# Patient Record
Sex: FEMALE | Race: WHITE | NOT HISPANIC OR LATINO | Employment: FULL TIME | URBAN - METROPOLITAN AREA
[De-identification: names, ages, dates, MRNs, and addresses within clinical notes are randomized per-mention and may not be internally consistent; named-entity substitution may affect disease eponyms.]

---

## 2018-04-30 ENCOUNTER — GYNECOLOGY VISIT (OUTPATIENT)
Dept: OBGYN | Facility: CLINIC | Age: 33
End: 2018-04-30
Payer: COMMERCIAL

## 2018-04-30 ENCOUNTER — HOSPITAL ENCOUNTER (OUTPATIENT)
Facility: MEDICAL CENTER | Age: 33
End: 2018-04-30
Attending: OBSTETRICS & GYNECOLOGY
Payer: COMMERCIAL

## 2018-04-30 VITALS
WEIGHT: 192 LBS | HEIGHT: 69 IN | BODY MASS INDEX: 28.44 KG/M2 | DIASTOLIC BLOOD PRESSURE: 70 MMHG | SYSTOLIC BLOOD PRESSURE: 115 MMHG

## 2018-04-30 DIAGNOSIS — Z11.51 SCREENING FOR HUMAN PAPILLOMAVIRUS (HPV): ICD-10-CM

## 2018-04-30 DIAGNOSIS — Z12.4 PAP SMEAR FOR CERVICAL CANCER SCREENING: ICD-10-CM

## 2018-04-30 DIAGNOSIS — Z01.419 WELL WOMAN EXAM WITH ROUTINE GYNECOLOGICAL EXAM: ICD-10-CM

## 2018-04-30 PROCEDURE — 87624 HPV HI-RISK TYP POOLED RSLT: CPT

## 2018-04-30 PROCEDURE — 99385 PREV VISIT NEW AGE 18-39: CPT | Performed by: OBSTETRICS & GYNECOLOGY

## 2018-04-30 PROCEDURE — 88175 CYTOPATH C/V AUTO FLUID REDO: CPT

## 2018-04-30 RX ORDER — NORGESTIMATE AND ETHINYL ESTRADIOL 0.25-0.035
1 KIT ORAL DAILY
Qty: 28 TAB | Refills: 12 | Status: SHIPPED | OUTPATIENT
Start: 2018-04-30 | End: 2019-03-19

## 2018-04-30 RX ORDER — METHYLPHENIDATE HYDROCHLORIDE 18 MG/1
18 TABLET ORAL EVERY MORNING
COMMUNITY
End: 2019-03-19

## 2018-04-30 RX ORDER — NORGESTIMATE AND ETHINYL ESTRADIOL 0.25-0.035
1 KIT ORAL DAILY
COMMUNITY
End: 2018-04-30 | Stop reason: SDUPTHER

## 2018-04-30 NOTE — PROGRESS NOTES
"Nhi Napier is a 32 y.o.  female who presents for her Annual Gynecologic Exam      HPI Comments: Pt presents for her annual well woman exam.   Pt has no complaints. Moved to [place] from [place] 1 year ago.  Patient's last menstrual period was 2018.    Review of Systems:   Pertinent positives documented in HPI and all other systems reviewed & are negative    PGYN Hx: OCPs for contraception, no abnl pap, no STDs    All PMH, PSH, allergies, social history and FH reviewed and updated today:  Past Medical History:   Diagnosis Date   • Hypertension    • Migraine        History reviewed. No pertinent surgical history.    Medications:   Current Outpatient Prescriptions Ordered in [facility]   Medication Sig Dispense Refill   • norgestimate-ethinyl estradiol (SPRINTEC 28) 0.25-35 MG-MCG per tablet Take 1 Tab by mouth every day.     • methylphenidate (CONCERTA) 18 MG CR tablet Take 18 mg by mouth every morning.       No current [facility]-ordered facility-administered medications on file.        Patient has no known allergies.    Social History     Social History   • Marital status:      Spouse name: N/A   • Number of children: N/A   • Years of education: N/A     Social History Main Topics   • Smoking status: Never Smoker   • Smokeless tobacco: Never Used   • Alcohol use Yes   • Drug use: No   • Sexual activity: Yes     Partners: Male     Birth control/ protection: Pill     Other Topics Concern   • Not on file     Social History Narrative   • No narrative on file       Family History   Problem Relation Age of Onset   • No Known Problems Mother    • Stroke Father           Objective:   Vital measurements:  Blood pressure 115/70, height 1.753 m (5' 9\"), weight 87.1 kg (192 lb), last menstrual period 2018, not currently breastfeeding.  Body mass index is 28.35 kg/m². (Goal BM I>18 <25)    Physical Exam   Nursing note and vitals reviewed.  Constitutional: She is oriented to person, place, and time. She " appears well-developed and well-nourished. No distress.     HEENT:   Head: Normocephalic and atraumatic.   Right Ear: External ear normal.   Left Ear: External ear normal.   Nose: Nose normal.   Eyes: Conjunctivae and EOM are normal. Pupils are equal, round, and reactive to light. No scleral icterus.     Neck: Normal range of motion. Neck supple. No tracheal deviation present. No thyromegaly present. No cervical or supraclavicular lymphadenopathy.    Pulmonary/Chest: Effort normal and breath sounds normal. No respiratory distress. She has no wheezes. She has no rales. She exhibits no tenderness.     Cardiovascular: Regular, rate and rhythm. No edema.    Breast: Symmetrical, normal consistency without masses., No dimpling or skin changes, Normal nipples without discharge, no axillary lymphadenopathy    Abdominal: Soft. Bowel sounds are normal. She exhibits no distension and no mass. No tenderness. She has no rebound and no guarding.     Genitourinary:  Pelvic exam was performed with patient supine.  External genitalia with no abnormal pigmentation, labial fusion, rashes, tenderness, lesions or injury to the labia bilaterally.  BUS normal  Vagina is pink and moist with no lesions, foul discharge, erythema, tenderness or bleeding. No foreign body around the vagina or signs of injury.   Cervix exhibits no motion tenderness, no discharge and no friability, no lesions.   Uterus is not deviated, not enlarged, not fixed and not tender.  Right adnexa displays no mass, no tenderness and no fullness.  Left adnexa displays no mass, no tenderness and no fullness.     Musculoskeletal: Normal range of motion. Non tender. She exhibits no edema and no tenderness.     Lymphadenopathy: She has no cervical or supraclavicular adenopathy.     Neurological: She is alert and oriented to person, place, and time. She exhibits normal muscle tone.     Skin: Skin is warm and dry. No rash noted. She is not diaphoretic. No erythema. No pallor.      Psychiatric: She has a normal mood and affect. Her behavior is normal. Judgment and thought content normal.        Assessment:     1. Well woman exam with routine gynecological exam  ThinPrep Pap, w/HPV rflx to genotype   2. Pap smear for cervical cancer screening  ThinPrep Pap, w/HPV rflx to genotype   3. Screening for human papillomavirus (HPV)  ThinPrep Pap, w/HPV rflx to genotype         Plan:   Pap and physical exam performed  Self breast awareness discussed.  Encouraged exercise and proper diet.  Mammograms starting @ age 40 annually.  See medications and orders placed in encounter report. OCPs sent to pharmacy  Follow up in 1 year for annual exam or sooner as needed

## 2018-05-02 LAB
CYTOLOGY REG CYTOL: NORMAL
HPV HR 12 DNA CVX QL NAA+PROBE: NEGATIVE
HPV16 DNA SPEC QL NAA+PROBE: NEGATIVE
HPV18 DNA SPEC QL NAA+PROBE: NEGATIVE
SPECIMEN SOURCE: NORMAL

## 2019-03-12 ENCOUNTER — GYNECOLOGY VISIT (OUTPATIENT)
Dept: OBGYN | Facility: CLINIC | Age: 34
End: 2019-03-12
Payer: COMMERCIAL

## 2019-03-12 VITALS
HEIGHT: 69 IN | DIASTOLIC BLOOD PRESSURE: 68 MMHG | SYSTOLIC BLOOD PRESSURE: 108 MMHG | BODY MASS INDEX: 27.7 KG/M2 | WEIGHT: 187 LBS

## 2019-03-12 DIAGNOSIS — O02.1 MISSED ABORTION: ICD-10-CM

## 2019-03-12 PROCEDURE — 99215 OFFICE O/P EST HI 40 MIN: CPT | Performed by: OBSTETRICS & GYNECOLOGY

## 2019-03-12 PROCEDURE — 76817 TRANSVAGINAL US OBSTETRIC: CPT | Performed by: OBSTETRICS & GYNECOLOGY

## 2019-03-12 RX ORDER — MISOPROSTOL 200 UG/1
800 TABLET ORAL EVERY 12 HOURS
Qty: 8 TAB | Refills: 0 | Status: SHIPPED | OUTPATIENT
Start: 2019-03-12 | End: 2019-03-19

## 2019-03-12 NOTE — PROGRESS NOTES
CC: Missed menses    Nhi Napier is a 33 y.o.  who presents presents due to missed menses. LMP 19. Reports she first had a positive pregnancy test on 19 and is thus is 9w0d based off LMP. She was not using anything for birthcontrol.  This is a planned and desired pregnancy.   Had bhcg drawn on 19 which was 3739.  Reports she has been feeling fine thus far in pregnancy. She denies nausea/vomiting, denies headache, denies dysuria, denies  vaginal bleeding/spotting, and denies contractions/cramping.    Partner: [facility]    Review of systems:  Pertinent positives documented in HPI and all other systems reviewed & are negative    GYN History:  Patient's last menstrual period was 2019 (exact date).. Menarche @ 17.  Menses regular, lasting 5-7days, not particularly heavy or painful.  Last pap last April,  no h/o abnormal pap.  No history of cone biopsy, LEEP or any other cervical, uterine or gynecologic surgery. No history of sexually transmitted diseases.  Currently sexually active with .  Utilizing nothing for contraception and has used OCPs, condoms in the past.     OB History:    OB History    Para Term  AB Living   1 0 0 0 0 0   SAB TAB Ectopic Molar Multiple Live Births   0 0 0 0 0 0      # Outcome Date GA Lbr Julien/2nd Weight Sex Delivery Anes PTL Lv   1 Current                   All PMH, PSH, allergies, social history and FH reviewed and updated today:  Past Medical History:  Past Medical History:   Diagnosis Date   • Hypertension    • Migraine    ADD    Past Surgical History:  History reviewed. No pertinent surgical history.    Medications:   Current Outpatient Prescriptions Ordered in [facility]   Medication Sig Dispense Refill   • Prenatal MV-Min-Fe Fum-FA-DHA (PRENATAL 1 PO) Take  by mouth.     • miSOPROStol (CYTOTEC) 200 MCG Tab Insert 4 Tabs in vagina every 12 hours. 8 Tab 0   • methylphenidate (CONCERTA) 18 MG CR tablet Take 18 mg by mouth every morning.     •  "norgestimate-ethinyl estradiol (SPRINTEC 28) 0.25-35 MG-MCG per tablet Take 1 Tab by mouth every day. 28 Tab 12     No current Epic-ordered facility-administered medications on file.      Allergies: Patient has no known allergies.    Social History:  Social History     Social History   • Marital status:      Spouse name: N/A   • Number of children: N/A   • Years of education: N/A     Social History Main Topics   • Smoking status: Never Smoker   • Smokeless tobacco: Never Used   • Alcohol use Yes   • Drug use: No   • Sexual activity: Yes     Partners: Male     Birth control/ protection: Pill     Other Topics Concern   • Not on file     Social History Narrative   • No narrative on file       Family History:  Family History   Problem Relation Age of Onset   • No Known Problems Mother    • Stroke Father            Objective:   Vitals:  Blood pressure 108/68, height 1.753 m (5' 9\"), weight 84.8 kg (187 lb), last menstrual period 01/08/2019, not currently breastfeeding.  Body mass index is 27.62 kg/m². (Goal BM I>18 <25)  Exam:  General: appears stated age  Head: normocephalic, non-tender  Neck: neck is supple, there is full range of motion  Abdomen: Bowel sounds positive, nondistended, soft, nontender x4, no rebound or guarding. No organomegaly. No masses. fundus not yet palpable  Female GYN: normal female external genitalia without lesions  Skin: No rashes, or ulcers or lesions seen  Psychiatric: appropriate affect, alert and oriented x3, intact judgment and insight.    Procedure:  Transvaginal US performed by me and per my read:    Indication: confirm dates.     Findings: Gestational sac, yolk sac present within the uterus.  Fetal pole with no FHM measures 4.1mm.  No color flow to gestation. Right ovary WNL. Left Ovary WNL. Cervical length 4.1cm. No free fluid in the cul-de-sac.    Impression: nonviable IUP.     No results found for this or any previous visit (from the past 336 hour(s)).     A/P:   33 y.o. "  here for missed   1. Missed   BETA HCG, QUANTITATIVE   #Missed .  Discussed ultrasound results with patient and her  today.  Pregnancy measuring only 4 mm and no flow to pregnancy.  Given patient has had a beta-hCG previously done offered for her to repeat the beta-hCG.  This will be confirmatory if value is lower than previously.  Discussed other management options including expectant management, medication, and surgical options.  Patient desires to have it hCG repeated and then medical management.  Lab draw has been ordered and misoprostol sent to pharmacy patient is instructed to take 800 mcg of misoprostol vaginally along with 800 mg of ibuprofen every 8 hours.  If she has not passed products of conception within 12 hours she is instructed to take a second dose.  Expectations for miscarriage are discussed.  #Return to clinic in 1 week for follow-up    Patient was seen for 40 minutes for new diagnosis of missed  of which > 50% of appointment time was spent on face-to-face counseling and coordination of care regarding the above.

## 2019-03-14 ENCOUNTER — TELEPHONE (OUTPATIENT)
Dept: OBGYN | Facility: CLINIC | Age: 34
End: 2019-03-14

## 2019-03-14 LAB — HCG INTACT+B SERPL-ACNC: NORMAL MIU/ML

## 2019-03-15 ENCOUNTER — PATIENT MESSAGE (OUTPATIENT)
Dept: OBGYN | Facility: CLINIC | Age: 34
End: 2019-03-15

## 2019-03-15 NOTE — PATIENT COMMUNICATION
Call patient to discuss beta-hCG result.  Discussed that this does not mean that this is not a missed .  We revisited her dates and she knows the date of conception which places her at 9 weeks pregnant.  Discussed how the ultrasound was not concordant with this and there was no blood flow going to the embryo.  After our conversation she reports that all of her questions were answered to her satisfaction and that she would like to proceed with taking the misoprostol this weekend.  We reviewed how to take misoprostol and she reports understanding.  She will return to clinic for follow-up appointment on Tuesday of next week.

## 2019-03-19 ENCOUNTER — GYNECOLOGY VISIT (OUTPATIENT)
Dept: OBGYN | Facility: CLINIC | Age: 34
End: 2019-03-19
Payer: COMMERCIAL

## 2019-03-19 VITALS
SYSTOLIC BLOOD PRESSURE: 112 MMHG | HEIGHT: 69 IN | WEIGHT: 189 LBS | BODY MASS INDEX: 27.99 KG/M2 | DIASTOLIC BLOOD PRESSURE: 72 MMHG

## 2019-03-19 DIAGNOSIS — O03.9 COMPLETE SPONTANEOUS ABORTION: ICD-10-CM

## 2019-03-19 DIAGNOSIS — Z31.69 ENCOUNTER FOR PRECONCEPTION CONSULTATION: ICD-10-CM

## 2019-03-19 PROCEDURE — 99214 OFFICE O/P EST MOD 30 MIN: CPT | Mod: 25 | Performed by: OBSTETRICS & GYNECOLOGY

## 2019-03-19 PROCEDURE — 76817 TRANSVAGINAL US OBSTETRIC: CPT | Performed by: OBSTETRICS & GYNECOLOGY

## 2019-03-19 NOTE — PROGRESS NOTES
GYN follow up visit     Nhi Napier is a 33 y.o.  who presents today for follow-up after treatment for missed .  She was seen last week and diagnosed with missed  with gestational sac and yolk sac but no fetal pole.  This finding was inconsistent with her sure date of conception and prior hCG draw.  Treatment options were offered and she elected to proceed with misoprostol.  She took misoprostol last Saturday morning.  Reports that she had severe cramps throughout the day and then passed products of conception Saturday evening.  Has had light bleeding since and minimal cramping but mostly feels okay now.  Relieved that this process over and continues to desire conception.  Is willing to wait as long as recommended before trying to conceive again however she does not want any form of contraception currently.  Is taking prenatal vitamins.  Denies any other complaints today.    OB History:    OB History    Para Term  AB Living   1 0 0 0 0 0   SAB TAB Ectopic Molar Multiple Live Births   0 0 0 0 0 0      # Outcome Date GA Lbr Julien/2nd Weight Sex Delivery Anes PTL Lv   1 Current                 Review of Systems:   Pertinent positives documented in HPI and all other systems reviewed & are negative.    All PMH, PSH, allergies, social history and FH reviewed and updated today:  Past Medical History:  Past Medical History:   Diagnosis Date   • Hypertension    • Migraine        Past Surgical History:  No past surgical history on file.    Medications:   Current Outpatient Prescriptions Ordered in [facility]   Medication Sig Dispense Refill   • Prenatal MV-Min-Fe Fum-FA-DHA (PRENATAL 1 PO) Take  by mouth.       No current Epic-ordered facility-administered medications on file.      Allergies: Patient has no known allergies.    Social History:  Social History     Social History   • Marital status:      Spouse name: N/A   • Number of children: N/A   • Years of education: N/A  "    Social History Main Topics   • Smoking status: Never Smoker   • Smokeless tobacco: Never Used   • Alcohol use Yes   • Drug use: No   • Sexual activity: Yes     Partners: Male     Birth control/ protection: Pill     Other Topics Concern   • Not on file     Social History Narrative   • No narrative on file       Family History:  Family History   Problem Relation Age of Onset   • No Known Problems Mother    • Stroke Father            Objective:   Vitals:  Blood pressure 112/72, height 1.753 m (5' 9\"), weight 85.7 kg (189 lb), last menstrual period 2019, not currently breastfeeding.  Body mass index is 27.91 kg/m². (Goal BM I>18 <25)    Physical Exam:   Nursing note and vitals reviewed.  Physical Exam  GENERAL: No acute distress  HENT: Atraumatic, normocephalic  EYES: Extraocular movements intact, pupils equal and reactive to light  NECK: Supple, Full ROM  CHEST: No deformities, Equal chest expansion  RESP: Unlabored, no stridor or audible wheeze  ABD: Soft, Nontender, Non-Distended  Extremities: No Clubbing, Cyanosis, or Edema  Skin: Warm/dry, without rases  Neuro: A/O x 4, CN 2-12 Grossly intact, Motor/sensory grossly intact  Psych: Normal mood, behavior, and affect    US performed and per my read:    Indication: Confirm resolved missed     Findings: Anteverted uterus with homogenous endometrial lining measuring 1.2 cm.  No adnexal masses, no free fluid within the cul-de-sac.  Right and left ovaries appear within normal limits.      Impression: Complete  with no empty uterus     Assessment/Plan:     1. Complete spontaneous      2. Encounter for preconception consultation         Nhi Napier is a 33 y.o.  female status post medical treatment for missed  now with complete spontaneous    #Complete spontaneous .  Empty uterus seen on ultrasound performed today.    #Preconception counseling. Patient is counseled about continuing to take prenatal " vitamins and timing of next pregnancy as she desires to continue to try for conception.  Questions were answered.      #Return to clinic 1 has positive pregnancy test or if she has any other concerns or questions.      Patient was seen for 25 minutes of which > 50% of appointment time was spent on face-to-face counseling and coordination of care regarding the above.

## 2019-08-13 LAB
ABO GROUP BLD: NORMAL
BLD GP AB SCN SERPL QL: NORMAL
HBV SURFACE AG SERPL QL IA: NEGATIVE
HIV 1+2 AB+HIV1 P24 AG SERPL QL IA: NON REACTIVE
RH BLD: POSITIVE
RUBV IGG SERPL IA-ACNC: 2.33
TREPONEMA PALLIDUM IGG+IGM AB [PRESENCE] IN SERUM OR PLASMA BY IMMUNOASSAY: NON REACTIVE

## 2019-12-30 LAB — GLUCOSE 1H P CHAL SERPL-MCNC: NORMAL MG/DL

## 2020-02-28 ENCOUNTER — APPOINTMENT (OUTPATIENT)
Dept: RADIOLOGY | Facility: MEDICAL CENTER | Age: 35
End: 2020-02-28
Attending: OBSTETRICS & GYNECOLOGY
Payer: COMMERCIAL

## 2020-02-28 ENCOUNTER — HOSPITAL ENCOUNTER (OUTPATIENT)
Facility: MEDICAL CENTER | Age: 35
End: 2020-02-28
Attending: OBSTETRICS & GYNECOLOGY | Admitting: OBSTETRICS & GYNECOLOGY
Payer: COMMERCIAL

## 2020-02-28 VITALS
DIASTOLIC BLOOD PRESSURE: 92 MMHG | HEIGHT: 69 IN | RESPIRATION RATE: 16 BRPM | WEIGHT: 227 LBS | TEMPERATURE: 98.2 F | BODY MASS INDEX: 33.62 KG/M2 | SYSTOLIC BLOOD PRESSURE: 135 MMHG | HEART RATE: 84 BPM | OXYGEN SATURATION: 95 %

## 2020-02-28 LAB
ALBUMIN SERPL BCP-MCNC: 3.6 G/DL (ref 3.2–4.9)
ALBUMIN/GLOB SERPL: 1.3 G/DL
ALP SERPL-CCNC: 105 U/L (ref 30–99)
ALT SERPL-CCNC: 12 U/L (ref 2–50)
ANION GAP SERPL CALC-SCNC: 7 MMOL/L (ref 0–11.9)
AST SERPL-CCNC: 11 U/L (ref 12–45)
BASOPHILS # BLD AUTO: 0.3 % (ref 0–1.8)
BASOPHILS # BLD: 0.03 K/UL (ref 0–0.12)
BILIRUB SERPL-MCNC: 0.6 MG/DL (ref 0.1–1.5)
BUN SERPL-MCNC: 8 MG/DL (ref 8–22)
CALCIUM SERPL-MCNC: 8.6 MG/DL (ref 8.5–10.5)
CHLORIDE SERPL-SCNC: 106 MMOL/L (ref 96–112)
CO2 SERPL-SCNC: 19 MMOL/L (ref 20–33)
CREAT SERPL-MCNC: 0.63 MG/DL (ref 0.5–1.4)
CREAT UR-MCNC: 64.6 MG/DL
EOSINOPHIL # BLD AUTO: 0.06 K/UL (ref 0–0.51)
EOSINOPHIL NFR BLD: 0.6 % (ref 0–6.9)
ERYTHROCYTE [DISTWIDTH] IN BLOOD BY AUTOMATED COUNT: 42.9 FL (ref 35.9–50)
GLOBULIN SER CALC-MCNC: 2.7 G/DL (ref 1.9–3.5)
GLUCOSE SERPL-MCNC: 85 MG/DL (ref 65–99)
HCT VFR BLD AUTO: 36.1 % (ref 37–47)
HGB BLD-MCNC: 12.2 G/DL (ref 12–16)
IMM GRANULOCYTES # BLD AUTO: 0.05 K/UL (ref 0–0.11)
IMM GRANULOCYTES NFR BLD AUTO: 0.5 % (ref 0–0.9)
LYMPHOCYTES # BLD AUTO: 1.5 K/UL (ref 1–4.8)
LYMPHOCYTES NFR BLD: 16.2 % (ref 22–41)
MCH RBC QN AUTO: 30.8 PG (ref 27–33)
MCHC RBC AUTO-ENTMCNC: 33.8 G/DL (ref 33.6–35)
MCV RBC AUTO: 91.2 FL (ref 81.4–97.8)
MONOCYTES # BLD AUTO: 0.39 K/UL (ref 0–0.85)
MONOCYTES NFR BLD AUTO: 4.2 % (ref 0–13.4)
NEUTROPHILS # BLD AUTO: 7.25 K/UL (ref 2–7.15)
NEUTROPHILS NFR BLD: 78.2 % (ref 44–72)
NRBC # BLD AUTO: 0 K/UL
NRBC BLD-RTO: 0 /100 WBC
PLATELET # BLD AUTO: 157 K/UL (ref 164–446)
PMV BLD AUTO: 10.9 FL (ref 9–12.9)
POTASSIUM SERPL-SCNC: 3.9 MMOL/L (ref 3.6–5.5)
PROT SERPL-MCNC: 6.3 G/DL (ref 6–8.2)
PROT UR-MCNC: 8.1 MG/DL (ref 0–15)
PROT/CREAT UR: 125 MG/G (ref 10–107)
RBC # BLD AUTO: 3.96 M/UL (ref 4.2–5.4)
SODIUM SERPL-SCNC: 132 MMOL/L (ref 135–145)
STREP GP B DNA PCR: NEGATIVE
URATE SERPL-MCNC: 4 MG/DL (ref 1.9–8.2)
WBC # BLD AUTO: 9.3 K/UL (ref 4.8–10.8)

## 2020-02-28 PROCEDURE — 59025 FETAL NON-STRESS TEST: CPT

## 2020-02-28 PROCEDURE — 36415 COLL VENOUS BLD VENIPUNCTURE: CPT

## 2020-02-28 PROCEDURE — 85025 COMPLETE CBC W/AUTO DIFF WBC: CPT

## 2020-02-28 PROCEDURE — 80053 COMPREHEN METABOLIC PANEL: CPT

## 2020-02-28 PROCEDURE — 76816 OB US FOLLOW-UP PER FETUS: CPT

## 2020-02-28 PROCEDURE — 82570 ASSAY OF URINE CREATININE: CPT

## 2020-02-28 PROCEDURE — 84550 ASSAY OF BLOOD/URIC ACID: CPT

## 2020-02-28 PROCEDURE — 84156 ASSAY OF PROTEIN URINE: CPT

## 2020-02-28 NOTE — DISCHARGE INSTRUCTIONS
General Instructions:  · If you think you are in labor, time contractions (lying on your left side) from the beginning of one contraction to the beginning of the next contraction for at least one hour.  · Increase fluid intake: you should consume 10-12 8 oz glasses of non-caffeinated fluid per day.  · Report any pressure or burning on urination to your physician.  · Monitor fetal movement: If you notice an absence or decrease in fetal movement, drink a large glass of water and rest on your side.  If there is no increase in movement, call your physician or go to the hospital for further evaluation.  · Report any sudden, sharp abdominal pain.  · Report any bleeding.  Spotting or pinkish discharge is normal after vaginal exam.  You may also spot after sexual intercourse.    Pre-term Labor (<37 weeks):  Call your physician or return to the hospital if:  · You have painless regular contractions more than 4 in one hour.  · Your water breaks (remember time and color).  · You have menstrual-like cramps, a low dull backache or pressure in your pelvis or back.  · Your baby does not move enough to complete the daily kick count (10 movements in 2 hours).  · Your baby moves much less often than on the days before or you have not felt your baby move all day.  · Please review the MEDICATION LIST section of your AFTER VISIT SUMMARY document.  · Take your medication as prescribed      Hypertension During Pregnancy  Hypertension is also called high blood pressure. High blood pressure means that the force of your blood moving in your body is too strong. When you are pregnant, this condition should be watched carefully. It can cause problems for you and your baby.  Follow these instructions at home:  Eating and drinking  · Drink enough fluid to keep your pee (urine) clear or pale yellow.  · Eat healthy foods that are low in salt (sodium).  ¨ Do not add salt to your food.  ¨ Check labels on foods and drinks to see much salt is in them.  "Look on the label where you see \"Sodium.\"  Lifestyle  · Do not use any products that contain nicotine or tobacco, such as cigarettes and e-cigarettes. If you need help quitting, ask your doctor.  · Do not use alcohol.  · Avoid caffeine.  · Avoid stress. Rest and get plenty of sleep.  General instructions  · Take over-the-counter and prescription medicines only as told by your doctor.  · While lying down, lie on your left side. This keeps pressure off your baby.  · While sitting or lying down, raise (elevate) your feet. Try putting some pillows under your lower legs.  · Exercise regularly. Ask your doctor what kinds of exercise are best for you.  · Keep all prenatal and follow-up visits as told by your doctor. This is important.  Contact a doctor if:  · You have symptoms that your doctor told you to watch for, such as:  ¨ Fever.  ¨ Throwing up (vomiting).  ¨ Headache.  Get help right away if:  · You have very bad pain in your belly (abdomen).  · You are throwing up, and this does not get better with treatment.  · You suddenly get swelling in your hands, ankles, or face.  · You gain 4 lb (1.8 kg) or more in 1 week.  · You get bleeding from your vagina.  · You have blood in your pee.  · You do not feel your baby moving as much as normal.  · You have a change in vision.  · You have muscle twitching or sudden tightening (spasms).  · You have trouble breathing.  · Your lips or fingernails turn blue.  This information is not intended to replace advice given to you by your health care provider. Make sure you discuss any questions you have with your health care provider.  Document Released: 01/20/2012 Document Revised: 08/29/2017 Document Reviewed: 08/29/2017  [employer] Interactive Patient Education © 2017 [employer]      Other Instructions:  Please carefully review your entire AFTER VISIT SUMMARY document for all discharge instructions.  "

## 2020-02-28 NOTE — PROGRESS NOTES
Pt presents from Dr. Fung's office for a PIH workup. Pt ambulated to [facility] for assessment.     0958 TOCO and EFM applied, VSS. Pt reports +FM, denies LOF or VB. Pt denies any HA, blurred vision and epigastric pain. No clonus and reflexes WNL. Swelling is minimal in lower extremities. Pt educated on POC.     1005 Lab at bedside    1025 US at bedside    1115 RN at bedside, pt denies any needs at this time    1140 US back, Dr. Ocampo updated on pt status. Labs and US reviewed. Orders for discharge received.     1145 RN at bedside, /term labor precautions given as well as instructions on PIH/preeclampsia. Pt verbalized understanding. All questions answered.     1150 Pt discharged home in stable condition.

## 2020-02-28 NOTE — PROGRESS NOTES
NST Review    Nhi Vang is a 33 yo  who presented at 36w4d for evaluation of elevated blood pressures. Denies HA, visual disturbances or abdominal pain. Blood pressures were in the 120-130's/ 80-90's. Vitals otherwise normal. NST was performed and reviewed by myself with a baseline of 130's. Moderate variability present. Accelerations present. Decelerations absent. Tocometer with irregular contractions. Preeclampsia lab evaluation unremarkable. Urine protein/creatinine ratio 125. Clinically her persistent hypertension is most consistent with gestational hypertension. She is stable for discharge home and follow up in clinic with Dr Fung as previously scheduled.     Arnoldo Ocampo M.D.

## 2020-03-08 ENCOUNTER — APPOINTMENT (OUTPATIENT)
Dept: OTHER | Facility: IMAGING CENTER | Age: 35
End: 2020-03-08
Payer: COMMERCIAL

## 2020-03-20 NOTE — H&P
DATE OF ADMISSION:  03/23/2020    HISTORY AND PHYSICAL:  This is a 34-year-old ____ P0, whose estimated date of   delivery is 03/23 based on her last menstrual period, which was consistent   with an 8-week ultrasound.  The patient is approaching postdates and desires   induction of labor after risks and benefits had been discussed.  She is   reporting good fetal movement, no contractions, no vaginal bleeding, no loss   of fluid.    REVIEW OF SYSTEMS:  Negative for nausea, vomiting, chest pain, shortness of   breath, cough or fever.    PAST MEDICAL HISTORY:  None.    PAST SURGICAL HISTORY:  None.    MEDICATIONS:  None.    ALLERGIES:  No known drug allergies.    SOCIAL HISTORY:  The patient denies tobacco, ethanol or drugs.    FAMILY HISTORY:  Significant for breast cancer.    PHYSICAL EXAMINATION:  GENERAL:  Well-developed, well-nourished female, in no apparent distress.  VITAL SIGNS:  She weighs 226 pounds.  Blood pressure 122/68.  HEART:  Regular rate and rhythm.  LUNGS:  Clear.  ABDOMEN:  Soft, gravid with a fundal height of 39 cm.  EXTREMITIES:  Show no clubbing, cyanosis or edema.  PELVIC:  Cervical exam is 0, 20% effaced and -3 station.    LABORATORY DATA:  Her blood type is O positive, antibody screen negative, HIV   negative, rubella immune, RPR nonreactive, hepatitis B negative.  One-hour   Glucola was 133.  GBS culture is negative.  Genetic testing was declined.    DIAGNOSTIC DATA:  The patient's ultrasound at 20 weeks had shown fetal   pyelectasis.  Repeat ultrasonography performed at 28 weeks, mild renal pelvis   dilatation at 4 mm and 5 mm.  Fetus will need renal ultrasound after delivery.    ASSESSMENT AND PLAN:  A 34-year-old ____ para 0 at 39 weeks for induction of   labor as she is approaching postdates.  Today, I discussed with the patient   risks and benefits of induction.  The patient voiced understanding and agrees   to proceed as scheduled.  Renal ultrasound on the infant after  delivery.             ____________________________________     MD SHANTANU Canela / BERTA    DD:  03/20/2020 13:40:17  DT:  03/20/2020 13:51:52    D#:  [id number]  Job#:  [id number]

## 2020-03-23 ENCOUNTER — HOSPITAL ENCOUNTER (INPATIENT)
Facility: MEDICAL CENTER | Age: 35
LOS: 3 days | End: 2020-03-26
Attending: OBSTETRICS & GYNECOLOGY | Admitting: OBSTETRICS & GYNECOLOGY
Payer: COMMERCIAL

## 2020-03-23 ENCOUNTER — ANESTHESIA EVENT (OUTPATIENT)
Dept: ANESTHESIOLOGY | Facility: MEDICAL CENTER | Age: 35
End: 2020-03-23
Payer: COMMERCIAL

## 2020-03-23 ENCOUNTER — APPOINTMENT (OUTPATIENT)
Dept: OBGYN | Facility: MEDICAL CENTER | Age: 35
End: 2020-03-23
Attending: OBSTETRICS & GYNECOLOGY
Payer: COMMERCIAL

## 2020-03-23 ENCOUNTER — ANESTHESIA (OUTPATIENT)
Dept: ANESTHESIOLOGY | Facility: MEDICAL CENTER | Age: 35
End: 2020-03-23
Payer: COMMERCIAL

## 2020-03-23 LAB
BASOPHILS # BLD AUTO: 0.1 % (ref 0–1.8)
BASOPHILS # BLD: 0.01 K/UL (ref 0–0.12)
EOSINOPHIL # BLD AUTO: 0.06 K/UL (ref 0–0.51)
EOSINOPHIL NFR BLD: 0.6 % (ref 0–6.9)
ERYTHROCYTE [DISTWIDTH] IN BLOOD BY AUTOMATED COUNT: 44.1 FL (ref 35.9–50)
HCT VFR BLD AUTO: 37.2 % (ref 37–47)
HGB BLD-MCNC: 12.5 G/DL (ref 12–16)
HOLDING TUBE BB 8507: NORMAL
IMM GRANULOCYTES # BLD AUTO: 0.06 K/UL (ref 0–0.11)
IMM GRANULOCYTES NFR BLD AUTO: 0.6 % (ref 0–0.9)
LYMPHOCYTES # BLD AUTO: 1.36 K/UL (ref 1–4.8)
LYMPHOCYTES NFR BLD: 14.7 % (ref 22–41)
MCH RBC QN AUTO: 30.8 PG (ref 27–33)
MCHC RBC AUTO-ENTMCNC: 33.6 G/DL (ref 33.6–35)
MCV RBC AUTO: 91.6 FL (ref 81.4–97.8)
MONOCYTES # BLD AUTO: 0.42 K/UL (ref 0–0.85)
MONOCYTES NFR BLD AUTO: 4.5 % (ref 0–13.4)
NEUTROPHILS # BLD AUTO: 7.37 K/UL (ref 2–7.15)
NEUTROPHILS NFR BLD: 79.5 % (ref 44–72)
NRBC # BLD AUTO: 0 K/UL
NRBC BLD-RTO: 0 /100 WBC
PLATELET # BLD AUTO: 136 K/UL (ref 164–446)
PMV BLD AUTO: 11.6 FL (ref 9–12.9)
RBC # BLD AUTO: 4.06 M/UL (ref 4.2–5.4)
WBC # BLD AUTO: 9.3 K/UL (ref 4.8–10.8)

## 2020-03-23 PROCEDURE — 700102 HCHG RX REV CODE 250 W/ 637 OVERRIDE(OP): Performed by: OBSTETRICS & GYNECOLOGY

## 2020-03-23 PROCEDURE — A9270 NON-COVERED ITEM OR SERVICE: HCPCS | Performed by: OBSTETRICS & GYNECOLOGY

## 2020-03-23 PROCEDURE — 770002 HCHG ROOM/CARE - OB PRIVATE (112)

## 2020-03-23 PROCEDURE — 36415 COLL VENOUS BLD VENIPUNCTURE: CPT

## 2020-03-23 PROCEDURE — 700111 HCHG RX REV CODE 636 W/ 250 OVERRIDE (IP)

## 2020-03-23 PROCEDURE — 700111 HCHG RX REV CODE 636 W/ 250 OVERRIDE (IP): Performed by: OBSTETRICS & GYNECOLOGY

## 2020-03-23 PROCEDURE — 700105 HCHG RX REV CODE 258: Performed by: OBSTETRICS & GYNECOLOGY

## 2020-03-23 PROCEDURE — 85025 COMPLETE CBC W/AUTO DIFF WBC: CPT

## 2020-03-23 RX ORDER — ROPIVACAINE HYDROCHLORIDE 2 MG/ML
INJECTION, SOLUTION EPIDURAL; INFILTRATION; PERINEURAL
Status: COMPLETED
Start: 2020-03-23 | End: 2020-03-23

## 2020-03-23 RX ORDER — MISOPROSTOL 200 UG/1
800 TABLET ORAL
Status: DISCONTINUED | OUTPATIENT
Start: 2020-03-23 | End: 2020-03-24 | Stop reason: HOSPADM

## 2020-03-23 RX ORDER — SODIUM CHLORIDE, SODIUM LACTATE, POTASSIUM CHLORIDE, CALCIUM CHLORIDE 600; 310; 30; 20 MG/100ML; MG/100ML; MG/100ML; MG/100ML
INJECTION, SOLUTION INTRAVENOUS CONTINUOUS
Status: DISPENSED | OUTPATIENT
Start: 2020-03-23 | End: 2020-03-23

## 2020-03-23 RX ORDER — ONDANSETRON 2 MG/ML
INJECTION INTRAMUSCULAR; INTRAVENOUS
Status: COMPLETED
Start: 2020-03-23 | End: 2020-03-23

## 2020-03-23 RX ORDER — SODIUM CHLORIDE, SODIUM LACTATE, POTASSIUM CHLORIDE, CALCIUM CHLORIDE 600; 310; 30; 20 MG/100ML; MG/100ML; MG/100ML; MG/100ML
INJECTION, SOLUTION INTRAVENOUS
Status: ACTIVE
Start: 2020-03-23 | End: 2020-03-24

## 2020-03-23 RX ORDER — ONDANSETRON 4 MG/1
4 TABLET, ORALLY DISINTEGRATING ORAL EVERY 6 HOURS PRN
Status: DISCONTINUED | OUTPATIENT
Start: 2020-03-23 | End: 2020-03-26 | Stop reason: HOSPADM

## 2020-03-23 RX ORDER — ONDANSETRON 2 MG/ML
4 INJECTION INTRAMUSCULAR; INTRAVENOUS EVERY 6 HOURS PRN
Status: DISCONTINUED | OUTPATIENT
Start: 2020-03-23 | End: 2020-03-26 | Stop reason: HOSPADM

## 2020-03-23 RX ORDER — DEXTROSE, SODIUM CHLORIDE, SODIUM LACTATE, POTASSIUM CHLORIDE, AND CALCIUM CHLORIDE 5; .6; .31; .03; .02 G/100ML; G/100ML; G/100ML; G/100ML; G/100ML
INJECTION, SOLUTION INTRAVENOUS CONTINUOUS
Status: DISCONTINUED | OUTPATIENT
Start: 2020-03-23 | End: 2020-03-26 | Stop reason: HOSPADM

## 2020-03-23 RX ORDER — OXYCODONE HYDROCHLORIDE AND ACETAMINOPHEN 5; 325 MG/1; MG/1
1 TABLET ORAL EVERY 4 HOURS PRN
Status: DISCONTINUED | OUTPATIENT
Start: 2020-03-23 | End: 2020-03-26 | Stop reason: HOSPADM

## 2020-03-23 RX ORDER — IBUPROFEN 600 MG/1
600 TABLET ORAL EVERY 6 HOURS PRN
Status: DISCONTINUED | OUTPATIENT
Start: 2020-03-23 | End: 2020-03-26 | Stop reason: HOSPADM

## 2020-03-23 RX ORDER — OXYCODONE HYDROCHLORIDE AND ACETAMINOPHEN 5; 325 MG/1; MG/1
2 TABLET ORAL EVERY 4 HOURS PRN
Status: DISCONTINUED | OUTPATIENT
Start: 2020-03-23 | End: 2020-03-26 | Stop reason: HOSPADM

## 2020-03-23 RX ADMIN — SODIUM CHLORIDE, POTASSIUM CHLORIDE, SODIUM LACTATE AND CALCIUM CHLORIDE: 600; 310; 30; 20 INJECTION, SOLUTION INTRAVENOUS at 18:35

## 2020-03-23 RX ADMIN — OXYTOCIN 1 MILLI-UNITS/MIN: 10 INJECTION, SOLUTION INTRAMUSCULAR; INTRAVENOUS at 19:56

## 2020-03-23 RX ADMIN — FENTANYL CITRATE 100 MCG: 50 INJECTION, SOLUTION INTRAMUSCULAR; INTRAVENOUS at 15:50

## 2020-03-23 RX ADMIN — MISOPROSTOL 25 MCG: 100 TABLET ORAL at 09:42

## 2020-03-23 RX ADMIN — SODIUM CHLORIDE, POTASSIUM CHLORIDE, SODIUM LACTATE AND CALCIUM CHLORIDE: 600; 310; 30; 20 INJECTION, SOLUTION INTRAVENOUS at 15:49

## 2020-03-23 RX ADMIN — SODIUM CHLORIDE, POTASSIUM CHLORIDE, SODIUM LACTATE AND CALCIUM CHLORIDE: 600; 310; 30; 20 INJECTION, SOLUTION INTRAVENOUS at 17:32

## 2020-03-23 RX ADMIN — ONDANSETRON 4 MG: 2 INJECTION INTRAMUSCULAR; INTRAVENOUS at 18:40

## 2020-03-23 RX ADMIN — ROPIVACAINE HYDROCHLORIDE 200 MG: 2 INJECTION, SOLUTION EPIDURAL; INFILTRATION at 17:33

## 2020-03-23 RX ADMIN — ONDANSETRON 4 MG: 2 SOLUTION INTRAMUSCULAR; INTRAVENOUS at 18:40

## 2020-03-23 ASSESSMENT — LIFESTYLE VARIABLES
CONSUMPTION TOTAL: NEGATIVE
TOTAL SCORE: 0
EVER_SMOKED: NEVER
AVERAGE NUMBER OF DAYS PER WEEK YOU HAVE A DRINK CONTAINING ALCOHOL: 0
TOTAL SCORE: 0
ON A TYPICAL DAY WHEN YOU DRINK ALCOHOL HOW MANY DRINKS DO YOU HAVE: 0
EVER HAD A DRINK FIRST THING IN THE MORNING TO STEADY YOUR NERVES TO GET RID OF A HANGOVER: NO
ALCOHOL_USE: NO
HAVE PEOPLE ANNOYED YOU BY CRITICIZING YOUR DRINKING: NO
HOW MANY TIMES IN THE PAST YEAR HAVE YOU HAD 5 OR MORE DRINKS IN A DAY: 0
HAVE YOU EVER FELT YOU SHOULD CUT DOWN ON YOUR DRINKING: NO
EVER FELT BAD OR GUILTY ABOUT YOUR DRINKING: NO
TOTAL SCORE: 0

## 2020-03-23 ASSESSMENT — PATIENT HEALTH QUESTIONNAIRE - PHQ9
2. FEELING DOWN, DEPRESSED, IRRITABLE, OR HOPELESS: NOT AT ALL
1. LITTLE INTEREST OR PLEASURE IN DOING THINGS: NOT AT ALL
1. LITTLE INTEREST OR PLEASURE IN DOING THINGS: NOT AT ALL
SUM OF ALL RESPONSES TO PHQ9 QUESTIONS 1 AND 2: 0
SUM OF ALL RESPONSES TO PHQ9 QUESTIONS 1 AND 2: 0
2. FEELING DOWN, DEPRESSED, IRRITABLE, OR HOPELESS: NOT AT ALL

## 2020-03-23 ASSESSMENT — FIBROSIS 4 INDEX: FIB4 SCORE: 0.65

## 2020-03-23 NOTE — PROGRESS NOTES
0811-  presents for elective IOL. Reports no ctx, leaking, or bleeding. Reports +FM. States her cervix is closed and US in office confirmed baby is head down. TOCO/EFM placed. POC discussed.   0825- Dr. Fung at bedside. SVE closed/3. Orders received.   0942- Cytotec placed. Admission profile completed. Pt oriented to room and monitors.  1230- Has ambulated and eaten lunch. Feeling more tightening. Denies needs. Watching movies with FOB [facility].   1356- SVE 1 cm per MD. Feeling ctx but coping well. Strip reviewed. Will observe another hour and reevaluate. TOCO adjusted.   1507- SVE unchanged per Dr. Fung. Pt feeling increasing cramping. Cook's cath discussed. Pt agrees.  1525- Cook's cath placed with 80 mL saline in uterine balloon, 60 mL in vaginal balloon. Pharmacological and nonpharmacological pain management options discussed.   1550- Fentanyl given per pt request. Reports relief.   Summary report: Fentanyl effective for approx an hour. Ctx became stronger. Pt vomiting. Epidural offered/requested. Pt received epidural without complications. Comfortable. Positioned to left side. Zofran given for vomiting. Report to HARRY Garcia RN at 1900.

## 2020-03-23 NOTE — PROGRESS NOTES
"Labor Progress Note    Nhi Rachele Napier   40w0d      Subjective:  Uterine contractions:yes  Pain: Yes. Severity: moderate    Objective:   Vitals:    03/23/20 0813 03/23/20 0830   BP: 129/89    Pulse: (!) 111    Temp: 37 °C (98.6 °F)    Weight:  102.5 kg (226 lb)   Height:  1.765 m (5' 9.5\")     Fetal heart variability: moderate  Cervical: 25% ;  Dilatation .1 ; Station negative2    Membranes ruptured: .no    Meds:   Epidural : .no  Pitocin: .no    Labs:  Recent Results (from the past 24 hour(s))   CBC WITH DIFFERENTIAL    Collection Time: 03/23/20  9:35 AM   Result Value Ref Range    WBC 9.3 4.8 - 10.8 K/uL    RBC 4.06 (L) 4.20 - 5.40 M/uL    Hemoglobin 12.5 12.0 - 16.0 g/dL    Hematocrit 37.2 37.0 - 47.0 %    MCV 91.6 81.4 - 97.8 fL    MCH 30.8 27.0 - 33.0 pg    MCHC 33.6 33.6 - 35.0 g/dL    RDW 44.1 35.9 - 50.0 fL    Platelet Count 136 (L) 164 - 446 K/uL    MPV 11.6 9.0 - 12.9 fL    Neutrophils-Polys 79.50 (H) 44.00 - 72.00 %    Lymphocytes 14.70 (L) 22.00 - 41.00 %    Monocytes 4.50 0.00 - 13.40 %    Eosinophils 0.60 0.00 - 6.90 %    Basophils 0.10 0.00 - 1.80 %    Immature Granulocytes 0.60 0.00 - 0.90 %    Nucleated RBC 0.00 /100 WBC    Neutrophils (Absolute) 7.37 (H) 2.00 - 7.15 K/uL    Lymphs (Absolute) 1.36 1.00 - 4.80 K/uL    Monos (Absolute) 0.42 0.00 - 0.85 K/uL    Eos (Absolute) 0.06 0.00 - 0.51 K/uL    Baso (Absolute) 0.01 0.00 - 0.12 K/uL    Immature Granulocytes (abs) 0.06 0.00 - 0.11 K/uL    NRBC (Absolute) 0.00 K/uL   HOLD BLOOD BANK SPECIMEN (NOT TESTED)    Collection Time: 03/23/20  9:35 AM   Result Value Ref Range    Holding Tube - Bb DONE        Assessment:   40w0d  Labor State: Early latent labor.  Too many contractions for more misoprostol  Cook balloon placed 80cc/60cc      Nasrin Fung M.D.          "

## 2020-03-23 NOTE — CARE PLAN
Problem: Safety  Goal: Free from accidental injury  Outcome: PROGRESSING AS EXPECTED     Problem: Knowledge Deficit  Goal: Patient/Support person demonstrates understanding regarding the progression of labor, available options and participates in decision-making process  Outcome: PROGRESSING AS EXPECTED  Note: G1 admitted for elective IOL.   Questions encouraged/answered.  POC/expectations discussed.      Problem: Psychosocial needs  Goal: Anxiety reduction  Outcome: PROGRESSING AS EXPECTED     Problem: Pain  Goal: Alleviation of Pain or a reduction in pain to the patient's comfort goal  Outcome: PROGRESSING AS EXPECTED  Note: Pharmacological and non pharmacological pain management options discussed.   Pt will request intervention as needed.      Problem: Risk for injury  Goal: Patient and fetus will be free of preventable injury/complications  Outcome: PROGRESSING AS EXPECTED  Note: Induction agents used per policy.

## 2020-03-24 PROCEDURE — 700111 HCHG RX REV CODE 636 W/ 250 OVERRIDE (IP)

## 2020-03-24 PROCEDURE — 0UQMXZZ REPAIR VULVA, EXTERNAL APPROACH: ICD-10-PCS | Performed by: OBSTETRICS & GYNECOLOGY

## 2020-03-24 PROCEDURE — 700111 HCHG RX REV CODE 636 W/ 250 OVERRIDE (IP): Performed by: OBSTETRICS & GYNECOLOGY

## 2020-03-24 PROCEDURE — 770002 HCHG ROOM/CARE - OB PRIVATE (112)

## 2020-03-24 PROCEDURE — 10907ZC DRAINAGE OF AMNIOTIC FLUID, THERAPEUTIC FROM PRODUCTS OF CONCEPTION, VIA NATURAL OR ARTIFICIAL OPENING: ICD-10-PCS | Performed by: OBSTETRICS & GYNECOLOGY

## 2020-03-24 PROCEDURE — 303615 HCHG EPIDURAL/SPINAL ANESTHESIA FOR LABOR

## 2020-03-24 PROCEDURE — A9270 NON-COVERED ITEM OR SERVICE: HCPCS | Performed by: OBSTETRICS & GYNECOLOGY

## 2020-03-24 PROCEDURE — 700102 HCHG RX REV CODE 250 W/ 637 OVERRIDE(OP): Performed by: OBSTETRICS & GYNECOLOGY

## 2020-03-24 PROCEDURE — 59409 OBSTETRICAL CARE: CPT

## 2020-03-24 PROCEDURE — 304965 HCHG RECOVERY SERVICES

## 2020-03-24 PROCEDURE — 700101 HCHG RX REV CODE 250

## 2020-03-24 PROCEDURE — 0KQM0ZZ REPAIR PERINEUM MUSCLE, OPEN APPROACH: ICD-10-PCS | Performed by: OBSTETRICS & GYNECOLOGY

## 2020-03-24 PROCEDURE — 700105 HCHG RX REV CODE 258: Performed by: OBSTETRICS & GYNECOLOGY

## 2020-03-24 PROCEDURE — 3E0P7VZ INTRODUCTION OF HORMONE INTO FEMALE REPRODUCTIVE, VIA NATURAL OR ARTIFICIAL OPENING: ICD-10-PCS | Performed by: OBSTETRICS & GYNECOLOGY

## 2020-03-24 PROCEDURE — 700105 HCHG RX REV CODE 258

## 2020-03-24 RX ORDER — SODIUM CHLORIDE, SODIUM LACTATE, POTASSIUM CHLORIDE, CALCIUM CHLORIDE 600; 310; 30; 20 MG/100ML; MG/100ML; MG/100ML; MG/100ML
INJECTION, SOLUTION INTRAVENOUS
Status: ACTIVE
Start: 2020-03-24 | End: 2020-03-24

## 2020-03-24 RX ORDER — ROPIVACAINE HYDROCHLORIDE 2 MG/ML
INJECTION, SOLUTION EPIDURAL; INFILTRATION; PERINEURAL
Status: COMPLETED
Start: 2020-03-24 | End: 2020-03-24

## 2020-03-24 RX ORDER — CARBOPROST TROMETHAMINE 250 UG/ML
250 INJECTION, SOLUTION INTRAMUSCULAR
Status: DISCONTINUED | OUTPATIENT
Start: 2020-03-24 | End: 2020-03-26 | Stop reason: HOSPADM

## 2020-03-24 RX ORDER — ACETAMINOPHEN 325 MG/1
650 TABLET ORAL ONCE
Status: COMPLETED | OUTPATIENT
Start: 2020-03-24 | End: 2020-03-24

## 2020-03-24 RX ORDER — SODIUM CHLORIDE, SODIUM LACTATE, POTASSIUM CHLORIDE, CALCIUM CHLORIDE 600; 310; 30; 20 MG/100ML; MG/100ML; MG/100ML; MG/100ML
INJECTION, SOLUTION INTRAVENOUS
Status: COMPLETED
Start: 2020-03-24 | End: 2020-03-24

## 2020-03-24 RX ORDER — SODIUM CHLORIDE, SODIUM LACTATE, POTASSIUM CHLORIDE, CALCIUM CHLORIDE 600; 310; 30; 20 MG/100ML; MG/100ML; MG/100ML; MG/100ML
INJECTION, SOLUTION INTRAVENOUS PRN
Status: DISCONTINUED | OUTPATIENT
Start: 2020-03-24 | End: 2020-03-26 | Stop reason: HOSPADM

## 2020-03-24 RX ORDER — VITAMIN A ACETATE, BETA CAROTENE, ASCORBIC ACID, CHOLECALCIFEROL, .ALPHA.-TOCOPHEROL ACETATE, DL-, THIAMINE MONONITRATE, RIBOFLAVIN, NIACINAMIDE, PYRIDOXINE HYDROCHLORIDE, FOLIC ACID, CYANOCOBALAMIN, CALCIUM CARBONATE, FERROUS FUMARATE, ZINC OXIDE, CUPRIC OXIDE 3080; 12; 120; 400; 1; 1.84; 3; 20; 22; 920; 25; 200; 27; 10; 2 [IU]/1; UG/1; MG/1; [IU]/1; MG/1; MG/1; MG/1; MG/1; MG/1; [IU]/1; MG/1; MG/1; MG/1; MG/1; MG/1
1 TABLET, FILM COATED ORAL EVERY MORNING
Status: DISCONTINUED | OUTPATIENT
Start: 2020-03-24 | End: 2020-03-26 | Stop reason: HOSPADM

## 2020-03-24 RX ORDER — MISOPROSTOL 200 UG/1
600 TABLET ORAL
Status: DISCONTINUED | OUTPATIENT
Start: 2020-03-24 | End: 2020-03-26 | Stop reason: HOSPADM

## 2020-03-24 RX ORDER — LIDOCAINE HYDROCHLORIDE 10 MG/ML
INJECTION, SOLUTION INFILTRATION; PERINEURAL
Status: COMPLETED
Start: 2020-03-24 | End: 2020-03-24

## 2020-03-24 RX ORDER — METHYLERGONOVINE MALEATE 0.2 MG/ML
0.2 INJECTION INTRAVENOUS
Status: DISCONTINUED | OUTPATIENT
Start: 2020-03-24 | End: 2020-03-26 | Stop reason: HOSPADM

## 2020-03-24 RX ADMIN — IBUPROFEN 600 MG: 600 TABLET ORAL at 21:26

## 2020-03-24 RX ADMIN — SODIUM CHLORIDE, POTASSIUM CHLORIDE, SODIUM LACTATE AND CALCIUM CHLORIDE: 600; 310; 30; 20 INJECTION, SOLUTION INTRAVENOUS at 04:15

## 2020-03-24 RX ADMIN — ROPIVACAINE HYDROCHLORIDE 200 MG: 2 INJECTION, SOLUTION EPIDURAL; INFILTRATION at 13:17

## 2020-03-24 RX ADMIN — AMPICILLIN SODIUM 2000 MG: 2 INJECTION, POWDER, FOR SOLUTION INTRAMUSCULAR; INTRAVENOUS at 09:24

## 2020-03-24 RX ADMIN — AMPICILLIN SODIUM 2000 MG: 2 INJECTION, POWDER, FOR SOLUTION INTRAMUSCULAR; INTRAVENOUS at 15:46

## 2020-03-24 RX ADMIN — ROPIVACAINE HYDROCHLORIDE 200 MG: 2 INJECTION, SOLUTION EPIDURAL; INFILTRATION at 02:40

## 2020-03-24 RX ADMIN — OXYCODONE HYDROCHLORIDE AND ACETAMINOPHEN 1 TABLET: 5; 325 TABLET ORAL at 14:17

## 2020-03-24 RX ADMIN — AMPICILLIN SODIUM 2000 MG: 2 INJECTION, POWDER, FOR SOLUTION INTRAMUSCULAR; INTRAVENOUS at 22:09

## 2020-03-24 RX ADMIN — GENTAMICIN SULFATE 425 MG: 40 INJECTION, SOLUTION INTRAMUSCULAR; INTRAVENOUS at 10:24

## 2020-03-24 RX ADMIN — ACETAMINOPHEN 650 MG: 325 TABLET, FILM COATED ORAL at 09:23

## 2020-03-24 RX ADMIN — SODIUM CHLORIDE, POTASSIUM CHLORIDE, SODIUM LACTATE AND CALCIUM CHLORIDE: 600; 310; 30; 20 INJECTION, SOLUTION INTRAVENOUS at 21:42

## 2020-03-24 RX ADMIN — OXYCODONE HYDROCHLORIDE AND ACETAMINOPHEN 1 TABLET: 5; 325 TABLET ORAL at 21:26

## 2020-03-24 RX ADMIN — SODIUM CHLORIDE, SODIUM LACTATE, POTASSIUM CHLORIDE, CALCIUM CHLORIDE AND DEXTROSE MONOHYDRATE: 5; 600; 310; 30; 20 INJECTION, SOLUTION INTRAVENOUS at 02:09

## 2020-03-24 RX ADMIN — ONDANSETRON 4 MG: 2 SOLUTION INTRAMUSCULAR; INTRAVENOUS at 06:14

## 2020-03-24 RX ADMIN — IBUPROFEN 600 MG: 600 TABLET ORAL at 14:17

## 2020-03-24 RX ADMIN — OXYTOCIN 125 ML/HR: 10 INJECTION, SOLUTION INTRAMUSCULAR; INTRAVENOUS at 11:24

## 2020-03-24 RX ADMIN — LIDOCAINE HYDROCHLORIDE: 10 INJECTION, SOLUTION INFILTRATION; PERINEURAL at 10:45

## 2020-03-24 ASSESSMENT — PAIN SCALES - GENERAL: PAIN_LEVEL: 0

## 2020-03-24 ASSESSMENT — EDINBURGH POSTNATAL DEPRESSION SCALE (EPDS)
I HAVE BEEN SO UNHAPPY THAT I HAVE HAD DIFFICULTY SLEEPING: NOT VERY OFTEN
I HAVE BEEN SO UNHAPPY THAT I HAVE BEEN CRYING: NO, NEVER
THE THOUGHT OF HARMING MYSELF HAS OCCURRED TO ME: NEVER
THINGS HAVE BEEN GETTING ON TOP OF ME: NO, MOST OF THE TIME I HAVE COPED QUITE WELL
I HAVE BEEN ANXIOUS OR WORRIED FOR NO GOOD REASON: HARDLY EVER
I HAVE FELT SAD OR MISERABLE: NOT VERY OFTEN
I HAVE LOOKED FORWARD WITH ENJOYMENT TO THINGS: AS MUCH AS I EVER DID
I HAVE BEEN ABLE TO LAUGH AND SEE THE FUNNY SIDE OF THINGS: AS MUCH AS I ALWAYS COULD
I HAVE BLAMED MYSELF UNNECESSARILY WHEN THINGS WENT WRONG: NOT VERY OFTEN
I HAVE FELT SCARED OR PANICKY FOR NO GOOD REASON: NO, NOT MUCH

## 2020-03-24 NOTE — PROGRESS NOTES
Labor Progress Note    Nhi Jeffrey Chidi Napier   40w1d      Subjective:  Uterine contractions:yes  Pain: No    Objective:   Vitals:    03/23/20 2247 03/23/20 2303 03/23/20 2319 03/23/20 2332   BP: 118/61 123/73 123/71 118/72   Pulse: (!) 116 90 (!) 107 (!) 102   Resp:       Temp:       TempSrc:       Weight:       Height:         Fetal heart variability: moderate  Fetal Heart Rate decelerations: variable  Fetal Heart Rate accelerations: yes  Baseline FHR: 140 per minute  Uterine contractions: regular, every 3-5 minutes  Cervical: 50% ;  Dilatation .7 ; Station negative2    Membranes ruptured: .yes    Meds:   Epidural : .yes  Pitocin: .yes    Labs:  Recent Results (from the past 24 hour(s))   CBC WITH DIFFERENTIAL    Collection Time: 03/23/20  9:35 AM   Result Value Ref Range    WBC 9.3 4.8 - 10.8 K/uL    RBC 4.06 (L) 4.20 - 5.40 M/uL    Hemoglobin 12.5 12.0 - 16.0 g/dL    Hematocrit 37.2 37.0 - 47.0 %    MCV 91.6 81.4 - 97.8 fL    MCH 30.8 27.0 - 33.0 pg    MCHC 33.6 33.6 - 35.0 g/dL    RDW 44.1 35.9 - 50.0 fL    Platelet Count 136 (L) 164 - 446 K/uL    MPV 11.6 9.0 - 12.9 fL    Neutrophils-Polys 79.50 (H) 44.00 - 72.00 %    Lymphocytes 14.70 (L) 22.00 - 41.00 %    Monocytes 4.50 0.00 - 13.40 %    Eosinophils 0.60 0.00 - 6.90 %    Basophils 0.10 0.00 - 1.80 %    Immature Granulocytes 0.60 0.00 - 0.90 %    Nucleated RBC 0.00 /100 WBC    Neutrophils (Absolute) 7.37 (H) 2.00 - 7.15 K/uL    Lymphs (Absolute) 1.36 1.00 - 4.80 K/uL    Monos (Absolute) 0.42 0.00 - 0.85 K/uL    Eos (Absolute) 0.06 0.00 - 0.51 K/uL    Baso (Absolute) 0.01 0.00 - 0.12 K/uL    Immature Granulocytes (abs) 0.06 0.00 - 0.11 K/uL    NRBC (Absolute) 0.00 K/uL   HOLD BLOOD BANK SPECIMEN (NOT TESTED)    Collection Time: 03/23/20  9:35 AM   Result Value Ref Range    Holding Tube - Bb DONE        Assessment:   40w1d  Labor State: Active phase labor.      Nasrin Fung M.D.

## 2020-03-24 NOTE — PROGRESS NOTES
"Pharmacy Kinetics 34 y.o. female on gentamicin day # 1  3/24/2020    Dosing Weight: 85 kg  Currently on Gentamicin 425 mg iv q24hr    Indication for treatment: intrapartum fever    Pertinent history per medical record: Admitted on 3/23/2020 for IOL at 40w gestation. Pt is now in active labor phase, spiked fever. Empiric abx ordered.    Other antibiotics: ampicillin    Allergies: Patient has no known allergies.     List concerns for renal function: obesity    Pertinent cultures to date:   GBS neg per H&P    Recent Labs     20  0935   WBC 9.3   NEUTSPOLYS 79.50*     No results for input(s): BUN, CREATININE, ALBUMIN in the last 72 hours.  No results for input(s): GENTTROUGH, GENTPEAK, GENTRANDOM in the last 72 hours.    Intake/Output Summary (Last 24 hours) at 3/24/2020 0932  Last data filed at 3/24/2020 0559  Gross per 24 hour   Intake --   Output 1800 ml   Net -1800 ml      /96   Pulse 85   Temp 38 °C (100.4 °F)   Resp 16   Ht 1.765 m (5' 9.5\")   Wt 102.5 kg (226 lb)  Temp (24hrs), Av.1 °C (98.8 °F), Min:36.7 °C (98 °F), Max:38 °C (100.4 °F)      A/P   1. Gentamicin dose change: new start  2. Next gentamicin level: TBD  3. Goal trough: <0.18 mcg/mL  4. Comments: New start empiric for fever. No renal concerns noted save obesity, dose adjusted for weight. Will order trough for routine monitoring if therapy duration >72 hrs.     Hui Kelsey, PharmD, BCOP      "

## 2020-03-24 NOTE — PROGRESS NOTES
Late entry/Summary Report:  Report received at 0700. Pt comfortable with epidural, lying on left side. Cervix was 9 cm on prior exam. Pit was restarted per order. Pt dilated to complete. MD remained in room throughout entire pushing process. After approx 2 hours of pushing with maternal fatigue, fever, and increasing fetal heart baseline, vacuum was discussed. Pt delivered vigorous male infant at 1035 with one application of the vacuum, nuchal x 1 reduced and 8/9 apgars. Fever treated with Tylenol, Amp, and Gent. Infant remained skin to skin t/o transition period alert, pink, and rooting. No consistent latch achieved. Lunch provided to mother. At 1300, pt up to bathroom, steady, + void. Natalie care provided and gown changed. 1330 To PP via w/c. Report to MERE Alexanrda.

## 2020-03-24 NOTE — ANESTHESIA POSTPROCEDURE EVALUATION
Patient: Nhi Napier    Procedure Summary     Date:  03/23/20 Room / Location:      Anesthesia Start:  1724 Anesthesia Stop:  03/24/20 1035    Procedure:  Labor Epidural Diagnosis:      Scheduled Providers:   Responsible Provider:  Mark Mcclendon M.D.    Anesthesia Type:  epidural ASA Status:  2          Final Anesthesia Type: epidural  Last vitals  BP   Blood Pressure: 123/75    Temp   36.9 °C (98.5 °F)    Pulse   Pulse: 93   Resp   16    SpO2          Anesthesia Post Evaluation    Patient location during evaluation: bedside  Patient participation: complete - patient participated  Level of consciousness: awake and alert  Pain score: 0    Airway patency: patent  Anesthetic complications: no  Cardiovascular status: hemodynamically stable  Respiratory status: acceptable  Hydration status: euvolemic    PONV: none    patient able to participate, but full recovery from regional anesthesia has not occurred and is not expected within the stipulated timeframe for the completion of the evaluation

## 2020-03-24 NOTE — ANESTHESIA PROCEDURE NOTES
Epidural Block  Date/Time: 3/23/2020 5:24 PM  Performed by: Jorge Luis Aquino M.D.  Authorized by: Jorge Luis Aquino M.D.     Patient Location:  OB  Start Time:  3/23/2020 5:24 PM  End Time:  3/23/2020 5:25 PM  Reason for Block: labor analgesia    patient identified, IV checked, site marked, risks and benefits discussed, surgical consent, monitors and equipment checked, pre-op evaluation and timeout performed    Patient Position:  Sitting  Prep: ChloraPrep, patient draped and sterile technique    Monitoring:  Blood pressure, continuous pulse oximetry and heart rate  Approach:  Midline  Location:  L3-L4  Injection Technique:  MANDO saline  Skin infiltration:  Lidocaine  Strength:  1%  Dose:  3ml  Needle Type:  Tuohy  Needle Gauge:  17 G  Needle Length:  3.5 in  Loss of resistance::  6  Catheter Size:  19 G  Catheter at Skin Depth:  11

## 2020-03-24 NOTE — ANESTHESIA QCDR
2019 [facility] Clinical Data Registry (for Quality Improvement)     Postoperative nausea/vomiting risk protocol (Adult = 18 yrs and Pediatric 3-17 yrs)- (430 and 463)  General inhalation anesthetic (NOT TIVA) with PONV risk factors: No  Provision of anti-emetic therapy with at least 2 different classes of agents: N/A  Patient DID NOT receive anti-emetic therapy and reason is documented in Medical Record: N/A    Multimodal Pain Management- (477)  Non-emergent surgery AND patient age >= 18:   Use of Multimodal Pain Management, two or more drugs and/or interventions, NOT including systemic opioids:   Exception: Documented allergy to multiple classes of analgesics:     Smoking Abstinence (404)  Patient is current smoker (cigarette, pipe, e-cig, marijuanna):   Elective Surgery:   Abstinence instructions provided prior to day of surgery:   Patient abstained from smoking on day of surgery:     Pre-Op Beta-Blocker in Isolated CABG (44)  Isolated CABG AND patient age >= 18:   Beta-blocker admin within 24 hours of surgical incision:   Exception:of medical reason(s) for not administering beta blocker within 24 hours prior to surgical incision (e.g., not  indicated,other medical reason):     PACU assessment of acute postoperative pain prior to Anesthesia Care End- Applies to Patients Age = 18- (ABG7)  Initial PACU pain score is which of the following: < 7/10  Patient unable to report pain score: N/A    Post-anesthetic transfer of care checklist/protocol to PACU/ICU- (426 and 427)  Upon conclusion of case, patient transferred to which of the following locations: PACU/Non-ICU  Use of transfer checklist/protocol: Yes  Exclusion: Service Performed in Patient Hospital Room (and thus did not require transfer): N/A  Unplanned admission to ICU related to anesthesia service up through end of PACU care- (MD51)  Unplanned admission to ICU (not initially anticipated at anesthesia start time): No

## 2020-03-24 NOTE — L&D DELIVERY NOTE
DATE OF SERVICE:  2020    PROCEDURE PERFORMED:  Vacuum-assisted vaginal delivery with repair of left   labial laceration, right labial laceration and left sulcus tear.      PROCEDURE IN DETAIL:  This is a 34-year-old , who was admitted at 40   weeks gestation for induction of labor.  The patient was given Cytotec   followed by a Cook catheter and Pitocin.  She underwent artificial rupture of   membranes and ultimately progressed to complete-complete and a +1 station.    The patient pushed until the fetal head descended to +2 station.  At that   point, there was loss of variability in between the contractions for the fetus   and the baby began having late decelerations.  The patient was consented for   vacuum-assisted vaginal delivery.  The bladder was drained.  Betadine was   placed on the Kiwi Mityvac which was placed on the occiput of the fetal head,   placed into yellow with suction until contraction.  At that point, the suction   was increased to green setting and with pushing and pulling at the same time,   the fetal head delivered within 3 pushes.  The vacuum had no pop-off and was   released without difficulty.  The nuchal cord was reduced.  The anterior and   posterior shoulder delivered without difficulty.  The infant male was placed   on the maternal abdomen and after approximately 1 minute, the cord was clamped   x2.  Apgars were 8 and 9.  The placenta delivered spontaneously, Owens   intact with a 3-vessel cord.  There was a second-degree sulcus laceration as   well as a left-sided labial tear and a right-sided labial laceration, all are   repaired with 2-0 and 3-0 chromics in a standard fashion after being   infiltrated with 1% lidocaine with epinephrine.  The estimated blood loss for   the procedure is 400 mL.  The mother is to recover in labor and delivery, the   infant will be transferred to the well-baby nursery.  Sponge and needle counts   were correct.        ____________________________________     MD SHANTANU Canela / BERTA    DD:  03/24/2020 11:20:40  DT:  03/24/2020 11:40:10    D#:  [id number]  Job#:  [id number]

## 2020-03-24 NOTE — CARE PLAN
Problem: Pain  Goal: Alleviation of Pain or a reduction in pain to the patient's comfort goal  Outcome: PROGRESSING AS EXPECTED  Note: Assessing and monitoring patient's pain and implementing pharmacologic and nonpharmacologic means of pain management as needed. Educating patient concerning pain management options such as the epidural and working with anesthesia to ensure adequate pain relief.       Problem: Risk for Infection, Impaired Wound Healing  Goal: Remain free from signs and symptoms of infection  Outcome: PROGRESSING AS EXPECTED  Note: Assessing and monitoring patient for s/s of infection and implementing precautions as needed. Educating patient and family concerning importance of hand hygiene especially when it comes to handling the baby

## 2020-03-24 NOTE — ANESTHESIA TIME REPORT
Anesthesia Start and Stop Event Times     Date Time Event    3/23/2020 1724 Ready for Procedure     1724 Anesthesia Start    3/24/2020 1035 Anesthesia Stop        Responsible Staff  03/23/20 to 03/24/20    Name Role Begin End    Mark Mcclendon M.D. Anesth 1724 1035    Jorge Luis Aquino M.D. Anesth 1724 1035        Preop Diagnosis (Free Text):  Pre-op Diagnosis             Preop Diagnosis (Codes):    Post op Diagnosis  Pregnant      Premium Reason  A. 3PM - 7AM    Comments:

## 2020-03-25 PROBLEM — D62 ACUTE BLOOD LOSS AS CAUSE OF POSTOPERATIVE ANEMIA: Status: ACTIVE | Noted: 2020-03-25

## 2020-03-25 LAB
ERYTHROCYTE [DISTWIDTH] IN BLOOD BY AUTOMATED COUNT: 46.3 FL (ref 35.9–50)
HCT VFR BLD AUTO: 28.2 % (ref 37–47)
HGB BLD-MCNC: 9.3 G/DL (ref 12–16)
MCH RBC QN AUTO: 30.7 PG (ref 27–33)
MCHC RBC AUTO-ENTMCNC: 32.6 G/DL (ref 33.6–35)
MCV RBC AUTO: 94 FL (ref 81.4–97.8)
PLATELET # BLD AUTO: 122 K/UL (ref 164–446)
PMV BLD AUTO: 11.7 FL (ref 9–12.9)
RBC # BLD AUTO: 3 M/UL (ref 4.2–5.4)
WBC # BLD AUTO: 13.5 K/UL (ref 4.8–10.8)

## 2020-03-25 PROCEDURE — A9270 NON-COVERED ITEM OR SERVICE: HCPCS | Performed by: OBSTETRICS & GYNECOLOGY

## 2020-03-25 PROCEDURE — 770002 HCHG ROOM/CARE - OB PRIVATE (112)

## 2020-03-25 PROCEDURE — 85027 COMPLETE CBC AUTOMATED: CPT

## 2020-03-25 PROCEDURE — 36415 COLL VENOUS BLD VENIPUNCTURE: CPT

## 2020-03-25 PROCEDURE — 700111 HCHG RX REV CODE 636 W/ 250 OVERRIDE (IP): Performed by: OBSTETRICS & GYNECOLOGY

## 2020-03-25 PROCEDURE — 700102 HCHG RX REV CODE 250 W/ 637 OVERRIDE(OP): Performed by: OBSTETRICS & GYNECOLOGY

## 2020-03-25 PROCEDURE — 700105 HCHG RX REV CODE 258: Performed by: OBSTETRICS & GYNECOLOGY

## 2020-03-25 RX ORDER — IBUPROFEN 600 MG/1
600 TABLET ORAL EVERY 6 HOURS PRN
Qty: 30 TAB | Refills: 0 | Status: SHIPPED | OUTPATIENT
Start: 2020-03-25 | End: 2022-04-07

## 2020-03-25 RX ORDER — FERROUS SULFATE 325(65) MG
325 TABLET ORAL
Qty: 60 TAB | Refills: 0 | Status: SHIPPED | OUTPATIENT
Start: 2020-03-25 | End: 2022-04-07

## 2020-03-25 RX ORDER — FERROUS SULFATE 325(65) MG
325 TABLET ORAL
Status: DISCONTINUED | OUTPATIENT
Start: 2020-03-25 | End: 2020-03-26 | Stop reason: HOSPADM

## 2020-03-25 RX ADMIN — AMPICILLIN SODIUM 2000 MG: 2 INJECTION, POWDER, FOR SOLUTION INTRAMUSCULAR; INTRAVENOUS at 10:02

## 2020-03-25 RX ADMIN — IBUPROFEN 600 MG: 600 TABLET ORAL at 04:45

## 2020-03-25 RX ADMIN — AMPICILLIN SODIUM 2000 MG: 2 INJECTION, POWDER, FOR SOLUTION INTRAMUSCULAR; INTRAVENOUS at 04:04

## 2020-03-25 RX ADMIN — VITAMIN A, VITAMIN C, VITAMIN D-3, VITAMIN E, VITAMIN B-1, VITAMIN B-2, NIACIN, VITAMIN B-6, CALCIUM, IRON, ZINC, COPPER 1 TABLET: 4000; 120; 400; 22; 1.84; 3; 20; 10; 1; 12; 200; 27; 25; 2 TABLET ORAL at 07:53

## 2020-03-25 RX ADMIN — FERROUS SULFATE TAB 325 MG (65 MG ELEMENTAL FE) 325 MG: 325 (65 FE) TAB at 07:53

## 2020-03-25 RX ADMIN — GENTAMICIN SULFATE 425 MG: 40 INJECTION, SOLUTION INTRAMUSCULAR; INTRAVENOUS at 12:22

## 2020-03-25 RX ADMIN — OXYCODONE HYDROCHLORIDE AND ACETAMINOPHEN 1 TABLET: 5; 325 TABLET ORAL at 06:49

## 2020-03-25 RX ADMIN — IBUPROFEN 600 MG: 600 TABLET ORAL at 21:50

## 2020-03-25 RX ADMIN — OXYCODONE HYDROCHLORIDE AND ACETAMINOPHEN 1 TABLET: 5; 325 TABLET ORAL at 21:50

## 2020-03-25 ASSESSMENT — PATIENT HEALTH QUESTIONNAIRE - PHQ9
SUM OF ALL RESPONSES TO PHQ9 QUESTIONS 1 AND 2: 0
2. FEELING DOWN, DEPRESSED, IRRITABLE, OR HOPELESS: NOT AT ALL
1. LITTLE INTEREST OR PLEASURE IN DOING THINGS: NOT AT ALL

## 2020-03-25 NOTE — ADDENDUM NOTE
Addendum  created 03/24/20 1900 by Mark Mcclendon M.D.    Clinical Note Signed, Delete clinical note, Intraprocedure Staff edited

## 2020-03-25 NOTE — PROGRESS NOTES
Patient arrived to room 333 at 1345. Report received from MERE Noriega. Educated patient on cuddles system, emergency pull cord, and call light.

## 2020-03-25 NOTE — CARE PLAN
Problem: Altered physiologic condition related to immediate post-delivery state and potential for bleeding/hemorrhage  Goal: Patient physiologically stable as evidenced by normal lochia, palpable uterine involution and vital signs within normal limits  Outcome: PROGRESSING AS EXPECTED  Note: VSS and within normal parameters. Fundus palpable and firm, lochia light rubra. Will continue to monitor.       Problem: Potential knowledge deficit related to lack of understanding of self and  care  Goal: Patient will verbalize understanding of self and infant care  Outcome: PROGRESSING AS EXPECTED  Note: Pt. Verbalizes that she is able to perform jonathan care independently and verbalizes understanding of IV currently in use. Pt. Responding to infant feeding cues and diaper changes appropriately with this RN assistance. Will continue to monitor.

## 2020-03-25 NOTE — LACTATION NOTE
MOB reports pumping 10+ml. Teach to offer breast first, feed back previous EBM which can be left @room temp for 4 hours, and then pump /hand expression. INJOY BF section of miguel added with instruction on navigation. Encouraged to watch supportive videos and call for support with latching as infant shows cues or every 3 hours, which ever comes first. Family voices understanding.

## 2020-03-25 NOTE — PROGRESS NOTES
1915 - Received report from MERE Deluca. Assumed care of pt. Plan of care discussed.    2100- Assessment complete. Fundus firm and palpable, lochia light rubra. Pain management and interventions discussed with pt. Pt. Verbalizes understanding. POB Bonding with infant well. MOB will need assistance with latching infant when FOB leaves for the night due to IV location in right AC and pt. Not being able to bend at elbow. Pt. ecouraged to call this RN when needed for assistance throughout the night. Discussed feeding frequencies, hand washing before handling infant, and safe sleeping practices. All other questions and concerns discussed at this time. No further needs. Encouraged pt. To call with needs. Will continue to monitor.     0030- Assisted MOB with latching infant, infant too sleepy. Hand expressed milk for MOB and spoon fed infant back expressed milk.

## 2020-03-25 NOTE — DISCHARGE SUMMARY
"Discharge Summary    Admission Date: 3/23/2020    Discharge Date: 3/25/2020    Diagnosis:Active Problems:     (spontaneous vaginal delivery)    Acute blood loss as cause of postoperative anemia    Subjective: Pain controlled. Normal lochia. Eating, voiding and ambulating without difficulty. Breast feeding.     /78   Pulse 95   Temp 37.1 °C (98.7 °F) (Temporal)   Resp 18   Ht 1.765 m (5' 9.5\")   Wt 102.5 kg (226 lb)   SpO2 99%   Breastfeeding Yes   BMI 32.90 kg/m²     GEN: NAD  GI:soft, NT, ND  :fundus firm and below umbilicus  EXT:no edema    Hospital Course: Nhi Napier is a 35 yo  who presented at 40w2d for term induction of labor. She is s/p VAVD of a viable male infant with APGARS of 8/9. EBL of 400cc. She had bilateral labials, a left sulcus, and a second degree laceration that were repaired. Post partum course was notable for asymptomatic anemia. She was meeting all post partum goals and was requesting discharge home on PPD#1.     Discharge Instructions   1. Diet : general  2. Activity: Pelvic rest for 6 weeks     Nhi Napier   Home Medication Instructions MAYELIN:[id number]    Printed on:20 8568   Medication Information                      doxylamine (UNISOM) 25 MG Tab tablet  Take 25 mg by mouth every bedtime.             ferrous sulfate 325 (65 Fe) MG tablet  Take 1 Tab by mouth every morning with breakfast.             ibuprofen (MOTRIN) 600 MG Tab  Take 1 Tab by mouth every 6 hours as needed (For cramping after delivery; do not give if patient is receiving ketorolac (Toradol)).             Prenatal MV-Min-Fe Fum-FA-DHA (PRENATAL 1 PO)  Take  by mouth.                   Follow up: 6 weeks    Complications:none    Arnoldo Ocampo M.D.    "

## 2020-03-25 NOTE — ANESTHESIA TIME REPORT
Anesthesia Start and Stop Event Times     Date Time Event    3/23/2020 1724 Ready for Procedure     1724 Anesthesia Start    3/24/2020 1035 Anesthesia Stop        Responsible Staff  03/23/20 to 03/24/20    Name Role Begin End    Jorge Luis Aquino M.D. Anesth 1724 0700    Mark Mcclendon M.D. Anesth 0700 1035        Preop Diagnosis (Free Text):  Pre-op Diagnosis             Preop Diagnosis (Codes):    Post op Diagnosis  Pregnant      Premium Reason  A. 3PM - 7AM    Comments:

## 2020-03-25 NOTE — CARE PLAN
Problem: Safety  Goal: Will remain free from injury  Outcome: PROGRESSING AS EXPECTED  Note: Pt re-educated about use of call light if any assistance is needed. Pt. Is able to ambulate without any assistance at this time.      Problem: Infection  Goal: Will remain free from infection  Outcome: PROGRESSING AS EXPECTED  Note: Pt remains afebrile at this time. Pt. Shows no other s/s of infection.

## 2020-03-25 NOTE — PROGRESS NOTES
0700-- Received report from MERE Salguero, Infant at bedside in open crib no signs of distress.  Pt resting in bed. Discussed pain management for the day.  No further needs at the time.  Call light within reach, bed locked and in lowest position.  Rounding in place.    0800-- Assessment completed, VSS, Pt declines PRN pain medication at this time.  Discussed plan of care for the day that pt is comfortable with.  All questions answered at this time.  Will continue to monitor.     1615- Report given to MERE Bailey.

## 2020-03-25 NOTE — LACTATION NOTE
This note was copied from a baby's chart.  MOB is a 33 y/o  who was induced @40 2/7 weeks gestation and delivered by vaccum with meconium. Infant is 25 hours and has not latched. This am he had a circumcision and a bath. Currently the baby is skin to skin with MOB. Attempt with some cues to latch infant, but infant quickly falls asleep. Set up with pumping, education on setting schedule, and cleaning of parts and to follow each pumping session with 1-2 minutes of hand expression. Teach to practice skin to skin often, attempting breastfeed every 2-3 hours, pump/hand express and supplement back 10 ml using EBM first and then DBM to equal volume using paced bottle feeding. Encouraged to call for assistance with latch as often as showing cues a minimum of 8x/24 hours. Family voices understanding. Encouraged to call for assistance as needed. Reported plan to RN and DBM was called for.

## 2020-03-26 VITALS
OXYGEN SATURATION: 95 % | HEART RATE: 85 BPM | BODY MASS INDEX: 32.35 KG/M2 | WEIGHT: 226 LBS | HEIGHT: 70 IN | DIASTOLIC BLOOD PRESSURE: 85 MMHG | TEMPERATURE: 98.7 F | RESPIRATION RATE: 16 BRPM | SYSTOLIC BLOOD PRESSURE: 121 MMHG

## 2020-03-26 PROCEDURE — 700102 HCHG RX REV CODE 250 W/ 637 OVERRIDE(OP): Performed by: OBSTETRICS & GYNECOLOGY

## 2020-03-26 PROCEDURE — A9270 NON-COVERED ITEM OR SERVICE: HCPCS | Performed by: OBSTETRICS & GYNECOLOGY

## 2020-03-26 RX ADMIN — VITAMIN A, VITAMIN C, VITAMIN D-3, VITAMIN E, VITAMIN B-1, VITAMIN B-2, NIACIN, VITAMIN B-6, CALCIUM, IRON, ZINC, COPPER 1 TABLET: 4000; 120; 400; 22; 1.84; 3; 20; 10; 1; 12; 200; 27; 25; 2 TABLET ORAL at 07:59

## 2020-03-26 RX ADMIN — FERROUS SULFATE TAB 325 MG (65 MG ELEMENTAL FE) 325 MG: 325 (65 FE) TAB at 07:59

## 2020-03-26 NOTE — PROGRESS NOTES
Assessment complete, VSS. Pt voiding without complaint. Pt states pain is 6/10 and PRN medication administered per MAR. POC discussed with pt and family, questions answered, call light within reach.

## 2020-03-26 NOTE — DISCHARGE INSTRUCTIONS
POSTPARTUM DISCHARGE INSTRUCTIONS FOR MOM    YOB: 1985   Age: 34 y.o.               Admit Date: 3/23/2020     Discharge Date: 3/26/2020  Attending Doctor:  Nasrin Fung M.D.                  Allergies:  Patient has no known allergies.    Discharged to home by car. Discharged via wheelchair, hospital escort: Yes.  Special equipment needed: Not Applicable  Belongings with: Personal  Be sure to schedule a follow-up appointment with your primary care doctor or any specialists as instructed.     Discharge Plan:   Diet Plan: Discussed  Activity Level: Discussed  Confirmed Follow up Appointment: Patient to Call and Schedule Appointment  Confirmed Symptoms Management: Discussed  Medication Reconciliation Updated: Yes  Influenza Vaccine Indication: Not indicated: Child 6 months to 8 years of age received 2 influenza vaccines > or equal to 4 weeks apart during their 1st influenza vaccination season    REASONS TO CALL YOUR OBSTETRICIAN:  1.   Persistent fever or shaking chills (Temperature higher than 100.4)  2.   Heavy bleeding (soaking more than 1 pad per hour); Passing clots  3.   Foul odor from vagina  4.   Mastitis (Breast infection; breast pain, chills, fever, redness)  5.   Urinary pain, burning or frequency  6.   Episiotomy infection  7.   Severe depression longer than 24 hours    HAND WASHING  · Prior to handling the baby.  · Before breastfeeding or bottle feeding baby.  · After using the bathroom or changing the baby's diaper.    VAGINAL CARE  · Nothing inside vagina for 6 weeks: no sexual intercourse, tampons or douching.  · Bleeding may continue for 2-4 weeks.  Amount may vary.    · Call your physician for heavy bleeding which means soaking more than 1 pad per hour    BIRTH CONTROL  · It is possible to become pregnant at any time after delivery and while breastfeeding.  · Plan to discuss a method of birth control with your physician at your follow up visit. visit.    DIET AND ELIMINATION  · Eating  "more fiber (bran cereal, fruits, and vegetables) and drinking plenty of fluids will help to avoid constipation.  · Urinary frequency after childbirth is normal.    POSTPARTUM BLUES  During the first few days after birth, you may experience a sense of the \"blues\" which may include impatience, irritability or even crying.  These feeling come and go quickly.  However, as many as 1 in 10 women experience emotional symptoms known as postpartum depression.    Postpartum depression:  May start as early as the second or third day after delivery or take several weeks or months to develop.  Symptoms of \"blues\" are present, but are more intense:  Crying spells; loss of appetite; feelings of hopelessness or loss of control; fear of touching the baby; over concern or no concern at all about the baby; little or no concern about your own appearance/caring for yourself; and/or inability to sleep or excessive sleeping.  Contact your physician if you are experiencing any of these symptoms.    Crisis Hotline:  · [place] Crisis Hotline:  8-698-EWPATIE  Or 1-123.218.4391  · [place] Crisis Hotline:  1-505.419.7106  Or 681-818-6444    PREVENTING SHAKEN BABY:  If you are angry or stressed, PUT THE BABY IN THE CRIB, step away, take some deep breaths, and wait until you are calm to care for the baby.  DO NOT SHAKE THE BABY.  You are not alone, call a supporter for help.    · Crisis Call Center 24/7 crisis line 229-508-0834 or 1-472.314.5239  · You can also text them, text \"ANSWER\" to 269746    QUIT SMOKING/TOBACCO USE:  I understand the use of any tobacco products increases my chance of suffering from future heart disease and could cause other illnesses which may shorten my life. Quitting the use of tobacco products is the single most important thing I can do to improve my health. For further information on smoking / tobacco cessation call a Toll Free Quit Line at 1-660.724.7700 (*National Cancer Ledbetter) or 1-791.399.8099 (American Lung " Association) or you can access the web based program at www.lungusa.org.    · [place] Tobacco Users Help Line:  (954) 435-3761       Toll Free: 1-943.669.6933  · Quit Tobacco Program [facility] (144)172-4508    DEPRESSION / SUICIDE RISK:  As you are discharged from this [facility], it is important to learn how to keep safe from harming yourself.    Recognize the warning signs:  · Abrupt changes in personality, positive or negative- including increase in energy   · Giving away possessions  · Change in eating patterns- significant weight changes-  positive or negative  · Change in sleeping patterns- unable to sleep or sleeping all the time   · Unwillingness or inability to communicate  · Depression  · Unusual sadness, discouragement and loneliness  · Talk of wanting to die  · Neglect of personal appearance   · Rebelliousness- reckless behavior  · Withdrawal from people/activities they love  · Confusion- inability to concentrate     If you or a loved one observes any of these behaviors or has concerns about self-harm, here's what you can do:  · Talk about it- your feelings and reasons for harming yourself  · Remove any means that you might use to hurt yourself (examples: pills, rope, extension cords, firearm)  · Get professional help from the community (Mental Health, Substance Abuse, psychological counseling)  · Do not be alone:Call your Safe Contact- someone whom you trust who will be there for you.  · Call your local CRISIS HOTLINE 130-0058 or 986-428-9856  · Call your local Children's Mobile Crisis Response Team [place] (110) 062-3231 or www.[employer]  · Call the toll free National Suicide Prevention Hotlines   · National Suicide Prevention Lifeline 367-655-WGXB (0812)  · National Hope Line Network 800-SUICIDE (103-1554)    DISCHARGE SURVEY:  Thank you for choosing [facility].  We hope we provided you with very good care.  You may be receiving a survey in the mail.   Please fill it out.  Your opinion is valuable to us.    ADDITIONAL EDUCATIONAL MATERIALS GIVEN TO PATIENT:        My signature on this form indicates that:  1.  I have reviewed and understand the above information  2.  My questions regarding this information have been answered to my satisfaction.  3.  I have formulated a plan with my discharge nurse to obtain my prescribed medication for home.

## 2020-03-26 NOTE — CARE PLAN
Problem: Altered physiologic condition related to immediate post-delivery state and potential for bleeding/hemorrhage  Goal: Patient physiologically stable as evidenced by normal lochia, palpable uterine involution and vital signs within normal limits  Outcome: PROGRESSING AS EXPECTED  Note: VSS. Fundus firm. Lochia light.     Problem: Alteration in comfort related to episiotomy, vaginal repair and/or after birth pains  Goal: Patient verbalizes acceptable pain level  Outcome: PROGRESSING AS EXPECTED  Note: Pt pain managed with PRN pain meds per MAR.

## 2020-03-26 NOTE — LACTATION NOTE
This note was copied from a baby's chart.  Infant more awake and rooting, but RN reports that infant will not open wide enough to latch; assessment of suck indicated strong coordinated suck, but infant sucks on tongue when rooting and will not open wide to grasp breast into deep latch. Shield used with application and cleaning taught; hand out given. 2 ml EBM syringed under shield to initiate suck, then infant continued with suck swallow coordination noted. MOB continues to pump every 3 hours getting 10+ml and is instructed to feed back the EBM under the shield using a syringe. Info given for Breastfeeding Medicine with instructions to call and leave a message to schedule a 1:1 lactaion appointment. Encourage MOB to call for assistance with latch as infant showing cues and continue skin to skin care. Family voices understanding.     Three step plan as outlined previously continues with shield used for latching @this time and direction to feed back EBM using syringe to put it under the shield while infant is @the Breast.

## 2020-03-26 NOTE — DISCHARGE SUMMARY
Discharge summary    Date of admission:  March 23, 2020  Date of discharge:   March 26, 2020    Discharge diagnoses:  1-term intrauterine pregnancy   2-delivery of viable male Apgars 8/9    Procedures:  1- spontaneous vaginal delivery        Hospital course:    Patient is a 34-year-old white female para 1,  who presented at term in active labor    Patient had an uncomplicated vaginal delivery.    Her postpartum course was benign with normal lochia.  Patient was subsequently discharged home      Progress Note    Subjective: Patient is doing well lochia is normal.  Has normal GI and  function.     Objective: Vital signs are normal  General: Patient's awake alert pleasant  Head: Atraumatic normocephalic  Abdomen: Fundus is firm, appropriate tenderness       Assessment:  Term intrauterine pregnancy  Postpartum day 2 after vaginal delivery    Plan:  DC home  F/u in 4-6 weeks  Activity infectious precautions reviewed    Discharge medications:  None  Ibuprofen 600 mg # 30,1 refill  Ferrous sulfate 325mg  PNV

## 2020-03-26 NOTE — PROGRESS NOTES
0800- Discharge teaching reviewed with pt, all questions answered.  Pt has all written information on self and infant care, including prescriptions,  screening slip and information, and follow-up instructions.  Pt will call when she is ready for car seat check.

## 2020-03-26 NOTE — CARE PLAN
Problem: Communication  Goal: The ability to communicate needs accurately and effectively will improve  Outcome: PROGRESSING AS EXPECTED  Note: Patient able to communicate wants and needs and states understanding of call light use.      Problem: Potential knowledge deficit related to lack of understanding of self and  care  Goal: Patient will demonstrate ability to care for self and infant  Outcome: PROGRESSING AS EXPECTED  Note: Patient is actively involved in routine infant care, feedings, changing, and holding/swaddling. Will continue to monitor and provide care.

## 2020-03-27 ENCOUNTER — OFFICE VISIT (OUTPATIENT)
Dept: OBGYN | Facility: CLINIC | Age: 35
End: 2020-03-27
Payer: COMMERCIAL

## 2020-03-27 DIAGNOSIS — O92.70 LACTATION PROBLEM: ICD-10-CM

## 2020-03-27 PROCEDURE — 99215 OFFICE O/P EST HI 40 MIN: CPT | Performed by: NURSE PRACTITIONER

## 2020-03-27 NOTE — PROGRESS NOTES
Subjective:     Nhi Napier is a 34 y.o. female here to establish lactation care. She is here today with baby and  (Zion).    Concerns:   Latch on difficulties , feeling that there is not enough milk  and sleepy baby  HPI:   Pertinent  history:   Mother does not have a history of advanced maternal age, GDM, hypertension prior to pregnancy, insulin resistance, multiple gestation, PCOS and thyroid disease. These are common conditions which may interfere in establishing milk supply.  Breast changes in pregnancy: Yes  History of breast surgeries: No      FEEDING HISTORY:    Past breastfeeding history:  First baby   Hospital course: Vacuum delivery with meconium.  Not interested in latching.  Lactation started with a nipple shield and syringe feeding pumped milk through it.  Discharged with nipple shield and syringe and using personal pump  Currently: Continued overnight with nipple shield and syringe, nursing one side, pumping the other side after the feeding.  Pumps about 4 mL unsure how much he is taking from the breast.  Both breasts: No   Bottle feeds: 0x/24h      Supplement: Expressed breast milk at the breast with the syringe  Quanity: What was pumped, generally about 4 mils  How given/devices:  Syringe     Nipple Shield Use: 24 mm  Recommended by: SUMEET MCDANIELS  When started? Within 24 hours    Breast Pumping:   Frequency: With each feeding  Type of pump: Medela pump and style  Flange size/type: 24 mm  NO pain with pumping    ROS:  Constitutional:   no fever, chills. Feeling well  Extremities Swelling: No extremity swelling  Gastrointestinal:  Negative for nausea, vomiting  Breasts: No soreness of breasts and No soreness of nipples  Psychiatric: Not experiencing anxiety and Managing ok  Mental Health:  NOT Feeling irritable, agitated, angry, overwhelmed, apathy, exhaustion  NOT having sleep changes, appetite changes.  Excellent support by .  Grandparents intended to be  here but with social isolation parents have hired a night nanny for the support.       Objective:     General: no acute distress  Neurological:  Alert and oriented x3  Breasts: Symetrical , Soft, Plugged Duct - no evidence and Mastitis  - no S/S  Nipples: intact  Psychiatric:  Normal mood and affect. Her behavior is normal. Judgment and thought content normal   Mental Health:  Sadness, crying, feeling overwhelmed, agitation, hypervigilance NOT exhibited   Assessment/Plan & Lactation Counseling:     Infant Weight History:   3/24/20 6#3.5oz  3/27/2020 5#10.9oz (9%loss day 3)  Infant intake at Breast::     Total 0ml  Milk Transfer at this feeding:   Ineffective breastfeeding; not able to transfer a full feed from breast r/t jaundice, weight today and milk establishing  Pumped: Type of Pump: Hospital grade    Quantity Pumped:     Total 15ml  Initiation of Feeding: Infant initiates  Position of Feeding:    Left: cross cradle  Attachment Achieved: rapidly  Nipple shield:   Size: 24mm       Introduced IP  Latch achieved yes  Suck Pattern at the breast: Minimal sucking with latch, left milk injected into shield in NS  Suck Pattern on the bottle: Suck burst and normal rest  Behavior Following Observed Feeding: sleeping  Nipple Pain None    Latch: Mom latches independently  Suckling/Feeding: attaches, baby falls asleep and not interested  Milk Supply Available:Establishing for day 3  Low milk supply:   Likely due to: ineffective or infrequent breast stimulation or milk removal    Diagnosis/Problem  Lactation problem, insufficient stimulation      Discussed concerns and symptoms as listed above in assessment and guidance summarized below.  Topics reviewed included:  •  Herbs and medications for increasing supply and their potential side effects and efficacy. No evidence base exists to support their use  •  Triple feeding and its sustainability and its impact on the mother baby relationship  •  Maternal Mental Health: Discussed  new mothering in encountering terror with the task to keep her baby alive, the alarming realness and  it is a journey and process.    •  Sleep or lack of and strategies to manage restorative sleep, although short amounts, significant to the mental health of the mother.   •  The smaller for gestational age baby is often poky at the breast, falling asleep, wants to  feed few times per day, falls asleep at the bottle too.  They need time to grow so supply must be supported as well as infant growth.In addition the vacuum extraction may make sucking not be as comfortable, this will all resolve.   •  Milk supply is dependent on how many times the baby removes milk and how well the breasts are emptied in a 24 hour period. This is a biological reality that we can't work around. If, for any reason, your baby is not latching, or you are not able to nurse, then it is important for you to remove the milk instead by pumping or hand expression.  There's no magic trick, tea, cookie or supplement that will maintain your milk supply. One  must  effectively remove milk to continue to make and maximize milk. In the early days and weeks that can be 8+ times in 24 hours. For older babies, on average 6-7 + times in 24 hours.    •  Feeding: Feed your baby every 1.5-2.5 hours, more often if baby acts hungry. Awaken baby for feeding if going over 3 hours in the day. Need to get in 8-12 feedings per 24 hours.   o  Supplement: Supplement initiated  o Give Expressed Breast Milk first before using formula with paced bottle feeding  ? Paced Bottle Feeding Video: https://www.youEnhanced Medical Decisionsube.com/watch?v=ZgYAL0kFD5W  o Baby needs were provided in writing from the hospital and parents have this information still .  •  Latch on Techniques for bare breast, modify for nipple shield use  o Fine tune latch:  - By holding your baby more securely at the breast, assisting your baby to stay attached by:  - Bringing your baby to your breast, not breast to the  "baby  - Your baby's cheek to touch breast securely, nose tipped back  - Hold your baby firmly in place so when your baby forgets to suck and picks it back up again your baby is in the correct spot. You will be extinguishing behavior and replacing it with a deeper latch to stimulate suck and provide satisfaction at the breast  - Your baby needs as much breast as deep in the mouth as possible to allow your nipples to heal and for you more importantly to maximize efficiency at the breast  - Latch is asymmetrical, leading with the chin, getting more underneath.  •  Nipple shield: 24mm Medela, before applying, roll shield in on itself and allow breast to be pulled  in to the tip.   •  Triple feeding: A short term solution: Breast/bottle/pump:  o FIRST decide what you can do at that feeding and you may decide to double feed -pump and bottle, if you are going to offer the breast at that feeding:  - nurse first and limit time on the breasts to 20+/- min total  - finish with bottle  - pump afterwards to finish emptying your breasts which will help increase milk supply  - use your own pumped milk, formula or donor human milk  - 30gm or ml = 1oz   Pumping guidelines:  o Both breasts using \"Hands-on Pumping\" for 10-15 minutes to stimulate milk production. See video at : http://newborns.[employer].edu/Breastfeeding/MaxProduction.html.  o Pump 8 times in 24 hours   o Breaks on pumping counting from the beginning of the pumping is one 5hr, two 3hrs, five 2.5hours  o Type of pump:  - Hospital grade  - http://www.[employer].com/healthcare-professionals/videos/[facility]-platinum-with-hygienikit-video  - Always double pump  o How long will vary woman to woman, typically 8-15 minutes, or 1-2 minutes after flow slows  o Flange Fit: Freely moving nipple in the tunnel with some movement of the areola.  - Today's evaluation indicates appropriate flange  •  Increasing supply besides Galactogogues and Pumping: Warmth, Relaxation , Physical, auditory " narratives, Childbirth relaxation Techniques, Acupuncture and acupressure, Shoulder Massages and Take a nap  •  Connect with other mothers:  o Facebook:   - [facility]: [email or web address]  Well-Nourished Babies (Private group for questions and support): https://www.facebook.com/groups/93519870451/     Parents expressed understanding, and asked appropriate questions    Summary: Parents desired evaluation before the weekend.  Most open to supplementation as needed.  Based on infant's weight and color although peeing and pooping will begin supplementation to support growth.  Will begin double pumping to support maximizing milk supply.  Anticipate milk supply coming in over the weekend and not needing formula as supply increases into early next week.  As baby increases in weight will naturally be more effective at the breast.  May continue to use the nipple shield to bridge this effectiveness.    Follow-up for infant weight check and dyad breastfeeding evaluation in 1 week(s)   Will see pediatrician on Monday morning  Please call [phone] if you have not scheduled your next appointment    A total of 60 minutes, this does not include infant assessment time, were spent face to face with more than 50% spent counseling and coordinating care as detailed in the above note.     PLEASE NOTE: Some of this note was created using voice recognition software. I have made every reasonable attempt to correct obvious errors, but I expect that there may be errors of grammar and possibly content that I did not discover prior finalizing this note.  GATITO Rivera.

## 2022-04-07 ENCOUNTER — OFFICE VISIT (OUTPATIENT)
Dept: URGENT CARE | Facility: CLINIC | Age: 37
End: 2022-04-07
Payer: COMMERCIAL

## 2022-04-07 VITALS
HEIGHT: 70 IN | OXYGEN SATURATION: 98 % | SYSTOLIC BLOOD PRESSURE: 126 MMHG | RESPIRATION RATE: 16 BRPM | BODY MASS INDEX: 26.26 KG/M2 | TEMPERATURE: 97.7 F | HEART RATE: 77 BPM | WEIGHT: 183.4 LBS | DIASTOLIC BLOOD PRESSURE: 88 MMHG

## 2022-04-07 DIAGNOSIS — J20.9 ACUTE BRONCHITIS WITH BRONCHOSPASM: ICD-10-CM

## 2022-04-07 PROCEDURE — 99203 OFFICE O/P NEW LOW 30 MIN: CPT | Performed by: EMERGENCY MEDICINE

## 2022-04-07 RX ORDER — ALBUTEROL SULFATE 90 UG/1
2 AEROSOL, METERED RESPIRATORY (INHALATION) EVERY 6 HOURS PRN
Qty: 8.5 G | Refills: 0 | Status: SHIPPED
Start: 2022-04-07 | End: 2022-09-27

## 2022-04-07 RX ORDER — NORETHINDRONE 0.35 MG/1
TABLET ORAL
COMMUNITY
Start: 2022-03-05 | End: 2022-09-27

## 2022-04-07 RX ORDER — BENZONATATE 100 MG/1
100 CAPSULE ORAL 3 TIMES DAILY PRN
Qty: 15 CAPSULE | Refills: 0 | Status: SHIPPED
Start: 2022-04-07 | End: 2022-09-27

## 2022-04-07 RX ORDER — METHYLPHENIDATE HYDROCHLORIDE 36 MG/1
TABLET ORAL
COMMUNITY
Start: 2022-02-28 | End: 2022-09-27

## 2022-04-07 ASSESSMENT — ENCOUNTER SYMPTOMS
COUGH: 1
HEARTBURN: 0
HEMOPTYSIS: 0
SORE THROAT: 1
HEADACHES: 0
SHORTNESS OF BREATH: 0
WHEEZING: 0
FEVER: 0
RHINORRHEA: 0

## 2022-04-07 NOTE — PROGRESS NOTES
"Subjective     Yun Rachele Napier is a 36 y.o. female who presents with Cough (3x days, \"last night at 8pm, dry cough, coughing up a little mucus, feels harder to breath\" )            Cough  This is a new problem. Episode onset: 3 days. The problem has been gradually worsening. The cough is non-productive. Associated symptoms include a sore throat. Pertinent negatives include no chest pain, ear pain, fever, headaches, heartburn, hemoptysis, rash, rhinorrhea, shortness of breath or wheezing. Exacerbated by: speaking. Her past medical history is significant for bronchitis.   Covid infection less than 3 months ago.  Notes similar episode in the past treated with bronchodilator.    Review of Systems   Constitutional: Negative for fever.   HENT: Positive for sore throat. Negative for ear pain and rhinorrhea.    Respiratory: Positive for cough. Negative for hemoptysis, shortness of breath and wheezing.    Cardiovascular: Negative for chest pain.   Gastrointestinal: Negative for heartburn.   Skin: Negative for rash.   Neurological: Negative for headaches.              Objective     /88 (BP Location: Right arm, Patient Position: Sitting, BP Cuff Size: Adult)   Pulse 77   Temp 36.5 °C (97.7 °F) (Temporal)   Resp 16   Ht 1.765 m (5' 9.5\")   Wt 83.2 kg (183 lb 6.4 oz)   SpO2 98%   BMI 26.70 kg/m²      Physical Exam  Constitutional:       General: She is not in acute distress.     Appearance: She is well-developed. She is not toxic-appearing.   HENT:      Nose: No mucosal edema or rhinorrhea.      Mouth/Throat:      Lips: Pink.      Mouth: Mucous membranes are moist.      Pharynx: No oropharyngeal exudate, posterior oropharyngeal erythema or uvula swelling.   Eyes:      Conjunctiva/sclera: Conjunctivae normal.   Neck:      Trachea: Trachea normal.   Cardiovascular:      Rate and Rhythm: Normal rate and regular rhythm.      Heart sounds: Normal heart sounds.   Pulmonary:      Effort: Pulmonary effort is " normal.      Breath sounds: Normal breath sounds.   Musculoskeletal:      Cervical back: Neck supple.   Lymphadenopathy:      Cervical: No cervical adenopathy.   Skin:     General: Skin is warm and dry.   Neurological:      Mental Status: She is alert.   Psychiatric:         Behavior: Behavior is cooperative.                             Assessment & Plan        1. Acute bronchitis with bronchospasm  Recommended supportive care measures, including rest, increasing oral fluid intake and use of over-the-counter medications for relief of symptoms.  - benzonatate (TESSALON) 100 MG Cap; Take 1 Capsule by mouth 3 times a day as needed for Cough.  Dispense: 15 Capsule; Refill: 0  - albuterol 108 (90 Base) MCG/ACT Aero Soln inhalation aerosol; Inhale 2 Puffs every 6 hours as needed for Shortness of Breath (coughing).  Dispense: 8.5 g; Refill: 0

## 2022-06-29 ENCOUNTER — HOSPITAL ENCOUNTER (OUTPATIENT)
Dept: RADIOLOGY | Facility: MEDICAL CENTER | Age: 37
End: 2022-06-29
Attending: OBSTETRICS & GYNECOLOGY
Payer: COMMERCIAL

## 2022-06-29 DIAGNOSIS — N64.4 BREAST PAIN: ICD-10-CM

## 2022-06-29 PROCEDURE — G0279 TOMOSYNTHESIS, MAMMO: HCPCS

## 2022-07-29 ENCOUNTER — HOSPITAL ENCOUNTER (OUTPATIENT)
Dept: LAB | Facility: MEDICAL CENTER | Age: 37
End: 2022-07-29
Attending: STUDENT IN AN ORGANIZED HEALTH CARE EDUCATION/TRAINING PROGRAM
Payer: COMMERCIAL

## 2022-07-29 LAB
25(OH)D3 SERPL-MCNC: 29 NG/ML (ref 30–100)
ALBUMIN SERPL BCP-MCNC: 4.3 G/DL (ref 3.2–4.9)
ALBUMIN/GLOB SERPL: 2 G/DL
ALP SERPL-CCNC: 47 U/L (ref 30–99)
ALT SERPL-CCNC: 13 U/L (ref 2–50)
ANION GAP SERPL CALC-SCNC: 11 MMOL/L (ref 7–16)
AST SERPL-CCNC: 13 U/L (ref 12–45)
BASOPHILS # BLD AUTO: 0.5 % (ref 0–1.8)
BASOPHILS # BLD: 0.03 K/UL (ref 0–0.12)
BILIRUB SERPL-MCNC: 1 MG/DL (ref 0.1–1.5)
BUN SERPL-MCNC: 14 MG/DL (ref 8–22)
CALCIUM SERPL-MCNC: 8.9 MG/DL (ref 8.4–10.2)
CHLORIDE SERPL-SCNC: 105 MMOL/L (ref 96–112)
CO2 SERPL-SCNC: 23 MMOL/L (ref 20–33)
CREAT SERPL-MCNC: 0.77 MG/DL (ref 0.5–1.4)
EOSINOPHIL # BLD AUTO: 0.09 K/UL (ref 0–0.51)
EOSINOPHIL NFR BLD: 1.4 % (ref 0–6.9)
ERYTHROCYTE [DISTWIDTH] IN BLOOD BY AUTOMATED COUNT: 40.8 FL (ref 35.9–50)
FASTING STATUS PATIENT QL REPORTED: NORMAL
GFR SERPLBLD CREATININE-BSD FMLA CKD-EPI: 103 ML/MIN/1.73 M 2
GLOBULIN SER CALC-MCNC: 2.2 G/DL (ref 1.9–3.5)
GLUCOSE SERPL-MCNC: 110 MG/DL (ref 65–99)
HCT VFR BLD AUTO: 39.2 % (ref 37–47)
HGB BLD-MCNC: 13.2 G/DL (ref 12–16)
IMM GRANULOCYTES # BLD AUTO: 0.01 K/UL (ref 0–0.11)
IMM GRANULOCYTES NFR BLD AUTO: 0.2 % (ref 0–0.9)
LYMPHOCYTES # BLD AUTO: 2.05 K/UL (ref 1–4.8)
LYMPHOCYTES NFR BLD: 32.2 % (ref 22–41)
MCH RBC QN AUTO: 30.3 PG (ref 27–33)
MCHC RBC AUTO-ENTMCNC: 33.7 G/DL (ref 33.6–35)
MCV RBC AUTO: 89.9 FL (ref 81.4–97.8)
MONOCYTES # BLD AUTO: 0.32 K/UL (ref 0–0.85)
MONOCYTES NFR BLD AUTO: 5 % (ref 0–13.4)
NEUTROPHILS # BLD AUTO: 3.86 K/UL (ref 2–7.15)
NEUTROPHILS NFR BLD: 60.7 % (ref 44–72)
NRBC # BLD AUTO: 0 K/UL
NRBC BLD-RTO: 0 /100 WBC
PLATELET # BLD AUTO: 165 K/UL (ref 164–446)
PMV BLD AUTO: 10 FL (ref 9–12.9)
POTASSIUM SERPL-SCNC: 3.8 MMOL/L (ref 3.6–5.5)
PROT SERPL-MCNC: 6.5 G/DL (ref 6–8.2)
RBC # BLD AUTO: 4.36 M/UL (ref 4.2–5.4)
SODIUM SERPL-SCNC: 139 MMOL/L (ref 135–145)
TSH SERPL DL<=0.005 MIU/L-ACNC: 2.77 UIU/ML (ref 0.38–5.33)
WBC # BLD AUTO: 6.4 K/UL (ref 4.8–10.8)

## 2022-07-29 PROCEDURE — 82306 VITAMIN D 25 HYDROXY: CPT

## 2022-07-29 PROCEDURE — 84443 ASSAY THYROID STIM HORMONE: CPT

## 2022-07-29 PROCEDURE — 36415 COLL VENOUS BLD VENIPUNCTURE: CPT

## 2022-07-29 PROCEDURE — 80053 COMPREHEN METABOLIC PANEL: CPT

## 2022-07-29 PROCEDURE — 85025 COMPLETE CBC W/AUTO DIFF WBC: CPT

## 2022-09-27 ENCOUNTER — OFFICE VISIT (OUTPATIENT)
Dept: URGENT CARE | Facility: CLINIC | Age: 37
End: 2022-09-27
Payer: COMMERCIAL

## 2022-09-27 VITALS
DIASTOLIC BLOOD PRESSURE: 64 MMHG | BODY MASS INDEX: 27.4 KG/M2 | SYSTOLIC BLOOD PRESSURE: 108 MMHG | WEIGHT: 185 LBS | OXYGEN SATURATION: 99 % | RESPIRATION RATE: 16 BRPM | TEMPERATURE: 98.2 F | HEART RATE: 61 BPM | HEIGHT: 69 IN

## 2022-09-27 DIAGNOSIS — K14.0 TRANSIENT LINGUAL PAPILLITIS: ICD-10-CM

## 2022-09-27 LAB
INT CON NEG: NORMAL
INT CON POS: NORMAL
S PYO AG THROAT QL: NEGATIVE

## 2022-09-27 PROCEDURE — 99213 OFFICE O/P EST LOW 20 MIN: CPT | Performed by: PHYSICIAN ASSISTANT

## 2022-09-27 PROCEDURE — 87880 STREP A ASSAY W/OPTIC: CPT | Performed by: PHYSICIAN ASSISTANT

## 2022-09-27 RX ORDER — LIDOCAINE HYDROCHLORIDE 20 MG/ML
SOLUTION OROPHARYNGEAL
Qty: 100 ML | Refills: 0 | Status: SHIPPED
Start: 2022-09-27 | End: 2022-10-02

## 2022-09-27 RX ORDER — METHYLPHENIDATE HYDROCHLORIDE 36 MG/1
TABLET ORAL
COMMUNITY
Start: 2021-07-01 | End: 2022-09-27

## 2022-09-27 RX ORDER — OXYCODONE HYDROCHLORIDE AND ACETAMINOPHEN 5; 325 MG/1; MG/1
TABLET ORAL
COMMUNITY
Start: 2022-08-31 | End: 2022-09-27

## 2022-09-27 ASSESSMENT — ENCOUNTER SYMPTOMS: SORE THROAT: 1

## 2022-09-27 ASSESSMENT — FIBROSIS 4 INDEX: FIB4 SCORE: 0.76

## 2022-09-28 NOTE — PROGRESS NOTES
"  Subjective:   Nhi Rachelehunter Napier is a 37 y.o. female who presents today with   Chief Complaint   Patient presents with   • Tongue Swelling     X2 days, burning/painful, bumps on tongue      Other  This is a new problem. Episode onset: 2 days. The problem occurs constantly. The problem has been unchanged. Associated symptoms include a sore throat (mild). She has tried nothing for the symptoms. The treatment provided no relief.   No recent injury or trauma to the area.  No new potential triggers.    PMH:  has a past medical history of Hypertension and Migraine.    She has no past medical history of Asthma, Blood clotting disorder ([facility]), Diabetes ([facility]), Gynecological disorder, Hemorrhagic disorder ([facility]), Seizure ([facility]), or Stroke ([facility]).  MEDS:   Current Outpatient Medications:   •  lidocaine (XYLOCAINE) 2 % Solution, Gargle and spit 5 mL every 6 hours as needed for tongue pain, Disp: 100 mL, Rfl: 0  •  methylphenidate (CONCERTA) 36 MG CR tablet, , Disp: , Rfl:   •  methylphenidate (CONCERTA) 36 MG CR tablet, , Disp: , Rfl:   •  benzonatate (TESSALON) 100 MG Cap, Take 1 Capsule by mouth 3 times a day as needed for Cough., Disp: 15 Capsule, Rfl: 0  •  albuterol 108 (90 Base) MCG/ACT Aero Soln inhalation aerosol, Inhale 2 Puffs every 6 hours as needed for Shortness of Breath (coughing)., Disp: 8.5 g, Rfl: 0  ALLERGIES: No Known Allergies  SURGHX: History reviewed. No pertinent surgical history.  SOCHX:  reports that she has never smoked. She has never used smokeless tobacco. She reports current alcohol use. She reports that she does not use drugs.  FH: Reviewed with patient, not pertinent to this visit.     Review of Systems   HENT:  Positive for sore throat (mild).         Painful tongue      Objective:   /64   Pulse 61   Temp 36.8 °C (98.2 °F) (Temporal)   Resp 16   Ht 1.753 m (5' 9\")   Wt 83.9 kg (185 lb)   SpO2 99%   BMI 27.32 kg/m²   Physical Exam  Vitals and nursing note reviewed. "   Constitutional:       General: She is not in acute distress.     Appearance: Normal appearance. She is well-developed. She is not ill-appearing or toxic-appearing.   HENT:      Head: Normocephalic and atraumatic.      Comments: Patent airway     Right Ear: Hearing normal.      Left Ear: Hearing normal.      Mouth/Throat:      Comments: Inflamed papilla appreciated to the lateral and anterior aspects of the tongue.  No specific tongue swelling overall but some of the papilla are more pronounced.  Cardiovascular:      Rate and Rhythm: Normal rate and regular rhythm.      Heart sounds: Normal heart sounds.   Pulmonary:      Effort: Pulmonary effort is normal.      Breath sounds: Normal breath sounds.   Musculoskeletal:      Comments: Normal movement in all 4 extremities   Skin:     General: Skin is warm and dry.   Neurological:      Mental Status: She is alert.      Coordination: Coordination normal.   Psychiatric:         Mood and Affect: Mood normal.   STREP A -    Assessment/Plan:   Assessment    1. Transient lingual papillitis  - POCT Rapid Strep A  - lidocaine (XYLOCAINE) 2 % Solution; Gargle and spit 5 mL every 6 hours as needed for tongue pain  Dispense: 100 mL; Refill: 0    Other orders  - methylphenidate (CONCERTA) 36 MG CR tablet;  (Patient not taking: Reported on 9/27/2022)  Symptoms and presentation are consistent with papillitis of the tongue.  No signs of angioedema or reaction at this time. Recommend patient avoid spicy foods or acidic drinks. Recommend cool liquids.  Discussed with patient it is likely self resolving.    Differential diagnosis, natural history, supportive care, and indications for immediate follow-up discussed.   Patient given instructions and understanding of medications and treatment.    If not improving in 3-5 days, F/U with PCP or return to  if symptoms worsen.    Patient agreeable to plan.    Please note that this dictation was created using voice recognition software. I have  made every reasonable attempt to correct obvious errors, but I expect that there are errors of grammar and possibly content that I did not discover before finalizing the note.    Edgardo Turner PA-C

## 2022-10-01 ENCOUNTER — APPOINTMENT (OUTPATIENT)
Dept: RADIOLOGY | Facility: MEDICAL CENTER | Age: 37
End: 2022-10-01
Attending: EMERGENCY MEDICINE
Payer: COMMERCIAL

## 2022-10-01 ENCOUNTER — HOSPITAL ENCOUNTER (EMERGENCY)
Facility: MEDICAL CENTER | Age: 37
End: 2022-10-02
Attending: EMERGENCY MEDICINE
Payer: COMMERCIAL

## 2022-10-01 DIAGNOSIS — D64.9 ANEMIA, UNSPECIFIED TYPE: ICD-10-CM

## 2022-10-01 DIAGNOSIS — O03.4 RETAINED PRODUCTS OF CONCEPTION AFTER MISCARRIAGE: ICD-10-CM

## 2022-10-01 DIAGNOSIS — N93.9 VAGINAL BLEEDING: ICD-10-CM

## 2022-10-01 LAB
ALBUMIN SERPL BCP-MCNC: 4.2 G/DL (ref 3.2–4.9)
ALBUMIN/GLOB SERPL: 2.2 G/DL
ALP SERPL-CCNC: 55 U/L (ref 30–99)
ALT SERPL-CCNC: 11 U/L (ref 2–50)
ANION GAP SERPL CALC-SCNC: 12 MMOL/L (ref 7–16)
AST SERPL-CCNC: 11 U/L (ref 12–45)
B-HCG SERPL-ACNC: 8.7 MIU/ML (ref 0–10)
BASOPHILS # BLD AUTO: 0.5 % (ref 0–1.8)
BASOPHILS # BLD: 0.04 K/UL (ref 0–0.12)
BILIRUB SERPL-MCNC: 0.6 MG/DL (ref 0.1–1.5)
BUN SERPL-MCNC: 17 MG/DL (ref 8–22)
CALCIUM SERPL-MCNC: 8.4 MG/DL (ref 8.4–10.2)
CHLORIDE SERPL-SCNC: 105 MMOL/L (ref 96–112)
CO2 SERPL-SCNC: 21 MMOL/L (ref 20–33)
CREAT SERPL-MCNC: 0.83 MG/DL (ref 0.5–1.4)
EOSINOPHIL # BLD AUTO: 0.11 K/UL (ref 0–0.51)
EOSINOPHIL NFR BLD: 1.4 % (ref 0–6.9)
ERYTHROCYTE [DISTWIDTH] IN BLOOD BY AUTOMATED COUNT: 39.6 FL (ref 35.9–50)
GFR SERPLBLD CREATININE-BSD FMLA CKD-EPI: 94 ML/MIN/1.73 M 2
GLOBULIN SER CALC-MCNC: 1.9 G/DL (ref 1.9–3.5)
GLUCOSE SERPL-MCNC: 126 MG/DL (ref 65–99)
HCG SERPL QL: POSITIVE
HCT VFR BLD AUTO: 32.1 % (ref 37–47)
HCT VFR BLD AUTO: 35.9 % (ref 37–47)
HGB BLD-MCNC: 10.8 G/DL (ref 12–16)
HGB BLD-MCNC: 12.3 G/DL (ref 12–16)
IMM GRANULOCYTES # BLD AUTO: 0.03 K/UL (ref 0–0.11)
IMM GRANULOCYTES NFR BLD AUTO: 0.4 % (ref 0–0.9)
LYMPHOCYTES # BLD AUTO: 2.34 K/UL (ref 1–4.8)
LYMPHOCYTES NFR BLD: 29.9 % (ref 22–41)
MCH RBC QN AUTO: 30.4 PG (ref 27–33)
MCHC RBC AUTO-ENTMCNC: 34.3 G/DL (ref 33.6–35)
MCV RBC AUTO: 88.9 FL (ref 81.4–97.8)
MONOCYTES # BLD AUTO: 0.32 K/UL (ref 0–0.85)
MONOCYTES NFR BLD AUTO: 4.1 % (ref 0–13.4)
NEUTROPHILS # BLD AUTO: 4.99 K/UL (ref 2–7.15)
NEUTROPHILS NFR BLD: 63.7 % (ref 44–72)
NRBC # BLD AUTO: 0 K/UL
NRBC BLD-RTO: 0 /100 WBC
NUMBER OF RH DOSES IND 8505RD: NORMAL
PLATELET # BLD AUTO: 180 K/UL (ref 164–446)
PMV BLD AUTO: 10.2 FL (ref 9–12.9)
POTASSIUM SERPL-SCNC: 3.3 MMOL/L (ref 3.6–5.5)
PROT SERPL-MCNC: 6.1 G/DL (ref 6–8.2)
RBC # BLD AUTO: 4.04 M/UL (ref 4.2–5.4)
RH BLD: NORMAL
SODIUM SERPL-SCNC: 138 MMOL/L (ref 135–145)
WBC # BLD AUTO: 7.8 K/UL (ref 4.8–10.8)

## 2022-10-01 PROCEDURE — 84702 CHORIONIC GONADOTROPIN TEST: CPT

## 2022-10-01 PROCEDURE — 85014 HEMATOCRIT: CPT

## 2022-10-01 PROCEDURE — 85025 COMPLETE CBC W/AUTO DIFF WBC: CPT

## 2022-10-01 PROCEDURE — 76856 US EXAM PELVIC COMPLETE: CPT

## 2022-10-01 PROCEDURE — 85018 HEMOGLOBIN: CPT

## 2022-10-01 PROCEDURE — 80053 COMPREHEN METABOLIC PANEL: CPT

## 2022-10-01 PROCEDURE — 36415 COLL VENOUS BLD VENIPUNCTURE: CPT

## 2022-10-01 PROCEDURE — 86901 BLOOD TYPING SEROLOGIC RH(D): CPT

## 2022-10-01 PROCEDURE — 700105 HCHG RX REV CODE 258: Performed by: EMERGENCY MEDICINE

## 2022-10-01 PROCEDURE — 99284 EMERGENCY DEPT VISIT MOD MDM: CPT

## 2022-10-01 PROCEDURE — 84703 CHORIONIC GONADOTROPIN ASSAY: CPT

## 2022-10-01 RX ORDER — SODIUM CHLORIDE 9 MG/ML
1000 INJECTION, SOLUTION INTRAVENOUS ONCE
Status: COMPLETED | OUTPATIENT
Start: 2022-10-01 | End: 2022-10-01

## 2022-10-01 RX ADMIN — SODIUM CHLORIDE 1000 ML: 9 INJECTION, SOLUTION INTRAVENOUS at 20:45

## 2022-10-01 ASSESSMENT — FIBROSIS 4 INDEX: FIB4 SCORE: 0.76

## 2022-10-02 ENCOUNTER — HOSPITAL ENCOUNTER (EMERGENCY)
Facility: MEDICAL CENTER | Age: 37
End: 2022-10-02
Attending: EMERGENCY MEDICINE
Payer: COMMERCIAL

## 2022-10-02 ENCOUNTER — ANESTHESIA EVENT (OUTPATIENT)
Dept: SURGERY | Facility: MEDICAL CENTER | Age: 37
End: 2022-10-02
Payer: COMMERCIAL

## 2022-10-02 ENCOUNTER — ANESTHESIA (OUTPATIENT)
Dept: SURGERY | Facility: MEDICAL CENTER | Age: 37
End: 2022-10-02
Payer: COMMERCIAL

## 2022-10-02 VITALS
TEMPERATURE: 97.1 F | HEIGHT: 69 IN | OXYGEN SATURATION: 100 % | DIASTOLIC BLOOD PRESSURE: 75 MMHG | BODY MASS INDEX: 27.43 KG/M2 | HEART RATE: 72 BPM | WEIGHT: 185.19 LBS | RESPIRATION RATE: 20 BRPM | SYSTOLIC BLOOD PRESSURE: 113 MMHG

## 2022-10-02 VITALS
HEIGHT: 69 IN | BODY MASS INDEX: 27.4 KG/M2 | OXYGEN SATURATION: 99 % | HEART RATE: 89 BPM | SYSTOLIC BLOOD PRESSURE: 113 MMHG | TEMPERATURE: 98.3 F | WEIGHT: 185 LBS | DIASTOLIC BLOOD PRESSURE: 60 MMHG | RESPIRATION RATE: 17 BRPM

## 2022-10-02 DIAGNOSIS — N93.9 VAGINAL BLEEDING: ICD-10-CM

## 2022-10-02 DIAGNOSIS — D62 ACUTE BLOOD LOSS ANEMIA: ICD-10-CM

## 2022-10-02 DIAGNOSIS — O03.4 RETAINED PRODUCTS OF CONCEPTION AFTER MISCARRIAGE: ICD-10-CM

## 2022-10-02 PROBLEM — O92.70 LACTATION PROBLEM: Status: RESOLVED | Noted: 2020-03-27 | Resolved: 2022-10-02

## 2022-10-02 LAB
ABO GROUP BLD: NORMAL
BLD GP AB SCN SERPL QL: NORMAL
HCT VFR BLD AUTO: 33.8 % (ref 37–47)
HGB BLD-MCNC: 11.5 G/DL (ref 12–16)
RH BLD: NORMAL

## 2022-10-02 PROCEDURE — 160009 HCHG ANES TIME/MIN: Performed by: OBSTETRICS & GYNECOLOGY

## 2022-10-02 PROCEDURE — 99291 CRITICAL CARE FIRST HOUR: CPT

## 2022-10-02 PROCEDURE — 700101 HCHG RX REV CODE 250: Performed by: ANESTHESIOLOGY

## 2022-10-02 PROCEDURE — 700105 HCHG RX REV CODE 258: Performed by: ANESTHESIOLOGY

## 2022-10-02 PROCEDURE — 160002 HCHG RECOVERY MINUTES (STAT): Performed by: OBSTETRICS & GYNECOLOGY

## 2022-10-02 PROCEDURE — 160048 HCHG OR STATISTICAL LEVEL 1-5: Performed by: OBSTETRICS & GYNECOLOGY

## 2022-10-02 PROCEDURE — A9270 NON-COVERED ITEM OR SERVICE: HCPCS | Performed by: ANESTHESIOLOGY

## 2022-10-02 PROCEDURE — 700102 HCHG RX REV CODE 250 W/ 637 OVERRIDE(OP): Performed by: ANESTHESIOLOGY

## 2022-10-02 PROCEDURE — 160029 HCHG SURGERY MINUTES - 1ST 30 MINS LEVEL 4: Performed by: OBSTETRICS & GYNECOLOGY

## 2022-10-02 PROCEDURE — 36415 COLL VENOUS BLD VENIPUNCTURE: CPT

## 2022-10-02 PROCEDURE — 85014 HEMATOCRIT: CPT

## 2022-10-02 PROCEDURE — 86901 BLOOD TYPING SEROLOGIC RH(D): CPT

## 2022-10-02 PROCEDURE — 160036 HCHG PACU - EA ADDL 30 MINS PHASE I: Performed by: OBSTETRICS & GYNECOLOGY

## 2022-10-02 PROCEDURE — 85018 HEMOGLOBIN: CPT

## 2022-10-02 PROCEDURE — 01966 ANES INDUCED ABORTION PX: CPT | Performed by: ANESTHESIOLOGY

## 2022-10-02 PROCEDURE — 700111 HCHG RX REV CODE 636 W/ 250 OVERRIDE (IP): Performed by: ANESTHESIOLOGY

## 2022-10-02 PROCEDURE — 88305 TISSUE EXAM BY PATHOLOGIST: CPT

## 2022-10-02 PROCEDURE — 86850 RBC ANTIBODY SCREEN: CPT

## 2022-10-02 PROCEDURE — 160035 HCHG PACU - 1ST 60 MINS PHASE I: Performed by: OBSTETRICS & GYNECOLOGY

## 2022-10-02 PROCEDURE — 700105 HCHG RX REV CODE 258: Performed by: EMERGENCY MEDICINE

## 2022-10-02 PROCEDURE — 86900 BLOOD TYPING SEROLOGIC ABO: CPT

## 2022-10-02 RX ORDER — SODIUM CHLORIDE 9 MG/ML
1000 INJECTION, SOLUTION INTRAVENOUS ONCE
Status: COMPLETED | OUTPATIENT
Start: 2022-10-02 | End: 2022-10-02

## 2022-10-02 RX ORDER — PHENYLEPHRINE HYDROCHLORIDE 10 MG/ML
INJECTION, SOLUTION INTRAMUSCULAR; INTRAVENOUS; SUBCUTANEOUS PRN
Status: DISCONTINUED | OUTPATIENT
Start: 2022-10-02 | End: 2022-10-02 | Stop reason: SURG

## 2022-10-02 RX ORDER — LABETALOL HYDROCHLORIDE 5 MG/ML
5 INJECTION, SOLUTION INTRAVENOUS
Status: DISCONTINUED | OUTPATIENT
Start: 2022-10-02 | End: 2022-10-02 | Stop reason: HOSPADM

## 2022-10-02 RX ORDER — OXYCODONE HCL 5 MG/5 ML
5 SOLUTION, ORAL ORAL
Status: COMPLETED | OUTPATIENT
Start: 2022-10-02 | End: 2022-10-02

## 2022-10-02 RX ORDER — SODIUM CHLORIDE, SODIUM LACTATE, POTASSIUM CHLORIDE, CALCIUM CHLORIDE 600; 310; 30; 20 MG/100ML; MG/100ML; MG/100ML; MG/100ML
INJECTION, SOLUTION INTRAVENOUS
Status: DISCONTINUED | OUTPATIENT
Start: 2022-10-02 | End: 2022-10-02 | Stop reason: SURG

## 2022-10-02 RX ORDER — OXYCODONE HCL 5 MG/5 ML
10 SOLUTION, ORAL ORAL
Status: COMPLETED | OUTPATIENT
Start: 2022-10-02 | End: 2022-10-02

## 2022-10-02 RX ORDER — KETOROLAC TROMETHAMINE 30 MG/ML
INJECTION, SOLUTION INTRAMUSCULAR; INTRAVENOUS PRN
Status: DISCONTINUED | OUTPATIENT
Start: 2022-10-02 | End: 2022-10-02 | Stop reason: SURG

## 2022-10-02 RX ORDER — LIDOCAINE HYDROCHLORIDE 20 MG/ML
INJECTION, SOLUTION EPIDURAL; INFILTRATION; INTRACAUDAL; PERINEURAL PRN
Status: DISCONTINUED | OUTPATIENT
Start: 2022-10-02 | End: 2022-10-02 | Stop reason: SURG

## 2022-10-02 RX ORDER — CEFAZOLIN SODIUM 1 G/3ML
INJECTION, POWDER, FOR SOLUTION INTRAMUSCULAR; INTRAVENOUS PRN
Status: DISCONTINUED | OUTPATIENT
Start: 2022-10-02 | End: 2022-10-02 | Stop reason: SURG

## 2022-10-02 RX ORDER — ALBUTEROL SULFATE 2.5 MG/3ML
2.5 SOLUTION RESPIRATORY (INHALATION)
Status: DISCONTINUED | OUTPATIENT
Start: 2022-10-02 | End: 2022-10-02 | Stop reason: HOSPADM

## 2022-10-02 RX ORDER — HYDROMORPHONE HYDROCHLORIDE 1 MG/ML
0.1 INJECTION, SOLUTION INTRAMUSCULAR; INTRAVENOUS; SUBCUTANEOUS
Status: DISCONTINUED | OUTPATIENT
Start: 2022-10-02 | End: 2022-10-02 | Stop reason: HOSPADM

## 2022-10-02 RX ORDER — MEPERIDINE HYDROCHLORIDE 25 MG/ML
12.5 INJECTION INTRAMUSCULAR; INTRAVENOUS; SUBCUTANEOUS
Status: DISCONTINUED | OUTPATIENT
Start: 2022-10-02 | End: 2022-10-02 | Stop reason: HOSPADM

## 2022-10-02 RX ORDER — HYDROMORPHONE HYDROCHLORIDE 1 MG/ML
0.2 INJECTION, SOLUTION INTRAMUSCULAR; INTRAVENOUS; SUBCUTANEOUS
Status: DISCONTINUED | OUTPATIENT
Start: 2022-10-02 | End: 2022-10-02 | Stop reason: HOSPADM

## 2022-10-02 RX ORDER — HYDROMORPHONE HYDROCHLORIDE 1 MG/ML
0.4 INJECTION, SOLUTION INTRAMUSCULAR; INTRAVENOUS; SUBCUTANEOUS
Status: DISCONTINUED | OUTPATIENT
Start: 2022-10-02 | End: 2022-10-02 | Stop reason: HOSPADM

## 2022-10-02 RX ORDER — ONDANSETRON 2 MG/ML
INJECTION INTRAMUSCULAR; INTRAVENOUS PRN
Status: DISCONTINUED | OUTPATIENT
Start: 2022-10-02 | End: 2022-10-02 | Stop reason: SURG

## 2022-10-02 RX ORDER — SODIUM CHLORIDE, SODIUM LACTATE, POTASSIUM CHLORIDE, CALCIUM CHLORIDE 600; 310; 30; 20 MG/100ML; MG/100ML; MG/100ML; MG/100ML
INJECTION, SOLUTION INTRAVENOUS CONTINUOUS
Status: DISCONTINUED | OUTPATIENT
Start: 2022-10-02 | End: 2022-10-02 | Stop reason: HOSPADM

## 2022-10-02 RX ORDER — HALOPERIDOL 5 MG/ML
1 INJECTION INTRAMUSCULAR
Status: DISCONTINUED | OUTPATIENT
Start: 2022-10-02 | End: 2022-10-02 | Stop reason: HOSPADM

## 2022-10-02 RX ORDER — HYDRALAZINE HYDROCHLORIDE 20 MG/ML
5 INJECTION INTRAMUSCULAR; INTRAVENOUS
Status: DISCONTINUED | OUTPATIENT
Start: 2022-10-02 | End: 2022-10-02 | Stop reason: HOSPADM

## 2022-10-02 RX ORDER — METOCLOPRAMIDE HYDROCHLORIDE 5 MG/ML
INJECTION INTRAMUSCULAR; INTRAVENOUS PRN
Status: DISCONTINUED | OUTPATIENT
Start: 2022-10-02 | End: 2022-10-02 | Stop reason: SURG

## 2022-10-02 RX ORDER — ONDANSETRON 2 MG/ML
4 INJECTION INTRAMUSCULAR; INTRAVENOUS
Status: DISCONTINUED | OUTPATIENT
Start: 2022-10-02 | End: 2022-10-02 | Stop reason: HOSPADM

## 2022-10-02 RX ORDER — DEXAMETHASONE SODIUM PHOSPHATE 4 MG/ML
INJECTION, SOLUTION INTRA-ARTICULAR; INTRALESIONAL; INTRAMUSCULAR; INTRAVENOUS; SOFT TISSUE PRN
Status: DISCONTINUED | OUTPATIENT
Start: 2022-10-02 | End: 2022-10-02 | Stop reason: SURG

## 2022-10-02 RX ADMIN — PHENYLEPHRINE HYDROCHLORIDE 200 MCG: 10 INJECTION INTRAVENOUS at 04:32

## 2022-10-02 RX ADMIN — METOCLOPRAMIDE 10 MG: 5 INJECTION, SOLUTION INTRAMUSCULAR; INTRAVENOUS at 04:38

## 2022-10-02 RX ADMIN — FENTANYL CITRATE 100 MCG: 50 INJECTION, SOLUTION INTRAMUSCULAR; INTRAVENOUS at 04:25

## 2022-10-02 RX ADMIN — SODIUM CHLORIDE, POTASSIUM CHLORIDE, SODIUM LACTATE AND CALCIUM CHLORIDE: 600; 310; 30; 20 INJECTION, SOLUTION INTRAVENOUS at 04:23

## 2022-10-02 RX ADMIN — OXYCODONE HYDROCHLORIDE 10 MG: 5 SOLUTION ORAL at 05:17

## 2022-10-02 RX ADMIN — PROPOFOL 200 MG: 10 INJECTION, EMULSION INTRAVENOUS at 04:25

## 2022-10-02 RX ADMIN — DEXAMETHASONE SODIUM PHOSPHATE 8 MG: 4 INJECTION, SOLUTION INTRA-ARTICULAR; INTRALESIONAL; INTRAMUSCULAR; INTRAVENOUS; SOFT TISSUE at 04:25

## 2022-10-02 RX ADMIN — CEFAZOLIN 2 G: 330 INJECTION, POWDER, FOR SOLUTION INTRAMUSCULAR; INTRAVENOUS at 04:25

## 2022-10-02 RX ADMIN — MIDAZOLAM 2 MG: 1 INJECTION INTRAMUSCULAR; INTRAVENOUS at 04:23

## 2022-10-02 RX ADMIN — LIDOCAINE HYDROCHLORIDE 100 MG: 20 INJECTION, SOLUTION EPIDURAL; INFILTRATION; INTRACAUDAL at 04:25

## 2022-10-02 RX ADMIN — ONDANSETRON 4 MG: 2 INJECTION INTRAMUSCULAR; INTRAVENOUS at 04:38

## 2022-10-02 RX ADMIN — SODIUM CHLORIDE 1000 ML: 9 INJECTION, SOLUTION INTRAVENOUS at 02:13

## 2022-10-02 RX ADMIN — KETOROLAC TROMETHAMINE 30 MG: 30 INJECTION, SOLUTION INTRAMUSCULAR at 04:38

## 2022-10-02 ASSESSMENT — PAIN DESCRIPTION - PAIN TYPE
TYPE: SURGICAL PAIN

## 2022-10-02 ASSESSMENT — FIBROSIS 4 INDEX: FIB4 SCORE: 0.64

## 2022-10-02 NOTE — DISCHARGE INSTRUCTIONS
If any questions arise, call your provider.  If your provider is not available, please feel free to call the [facility] at [phone].    MEDICATIONS: Resume taking daily medication.  Take prescribed pain medication with food.  If no medication is prescribed, you may take non-aspirin pain medication if needed.  PAIN MEDICATION CAN BE VERY CONSTIPATING.  Take a stool softener or laxative such as senokot, pericolace, or milk of magnesia if needed.    Last pain medication given at

## 2022-10-02 NOTE — OP REPORT
DATE OF SERVICE:  10/2/2022    PREOPERATIVE DIAGNOSIS:    38yo  with retained products of conception after 6 week miscarriage  Vaginal bleeding  Acute blood loss anemia  Rh positive    POSTOPERATIVE DIAGNOSIS:  same    PROCEDURE PERFORMED:  Suction dilation and evacuation    SURGEON:  Tamiko Metcalf MD.    ASSISTANT:  none    ANESTHESIA:  General anesthesia w/ LMA.    ANESTHESIOLOGIST:  Silas Lopez MD    ESTIMATED BLOOD LOSS:  20 mL    FINDINGS: normal vagina and cervix, uterus sounded to a depth of 7.5cm, small amount retained products of conception w/in the endometrial cavity removed    COMPLICATIONS:  None.    PROCEDURE:  After appropriate consents were obtained, the patient was taken to the operating room where general anesthesia was applied without difficulty.  She was prepped and draped in the usual sterile fashion. The patient was placed in the dorsal lithotomy position with Jason stirrups. A metal bivalve speculum was placed into the vagina. The cervix was grasped with a single tooth tenaculum. The uterus was sounded to a depth of 7.5cm. The cervix was already dilated enough to accommodate the suction device so no additional dilation was performed. A suction device #6 flexible curette was obtained and placed under 60mmhg pressure. It was advanced to the fundus of the uterus. The contents of the uterus was evacuated. Sharp curettage was also performed until a cry was noted throughout. The uterus clamped down well without bleeding and was firm. Once all the products were visualized removed, the curette was removed from the uterus and the tenaculum was removed from the cervix. Excellent hemostasis was noted. The patient tolerated procedure well. Sponge, lap and needle counts were correct x 2. She was taken to recovery in stable condition.           ____________________________________     Tamiko Metcalf MD

## 2022-10-02 NOTE — OR NURSING
Handoff report received from MERE Gonsalez. VSS Pt on room air. Afebrile. Pt reports no pain or nausea. Tolerated sips of water. Up to restroom standby assist to void. Pt states ride is headed in to pick her up.

## 2022-10-02 NOTE — ED PROVIDER NOTES
ED Provider Note      Means of Arrival: EMS  History obtained from: Patient, EMS, medical record    CHIEF COMPLAINT  Chief Complaint   Patient presents with    Vaginal Bleeding     Transfer from [facility], miscarriage x6 weeks ago, vag bleeding began 10/1 at 1900, low BP       HPI  Nih Rachele Napier is a 37 y.o. female who presents with heavy vaginal bleeding.  She is G3, P2 with 1 live birth to went to her 8-week checkup and found fetal demise at 6 weeks.  She was given medications to help pass the products of conception, and did have some bleeding afterwards, however follow-up ultrasound showed what looked like a clot in the uterus.  She was given additional medications but did not have another significant episode of bleeding at that time.  She has been having intermittent spotting but in the morning she began having large clots passed over the point where she felt lightheaded.  She called EMS, and had a initial blood pressure of 78/30.  She reports that she has not been having significant abdominal pain or fever with this.  She reports that over the past several hours her bleeding appears to have greatly slowed or ceased.  She was seen at [facility] where ultrasound performed concerning for retained products of conception and she was referred for OB/GYN evaluation.    REVIEW OF SYSTEMS  CONSTITUTIONAL:  No fever.  CARDIOVASCULAR:  No chest discomfort.  RESPIRATORY:  No pleuritic chest pain.  GASTROINTESTINAL:  No abdominal pain.  GENITOURINARY:   No dysuria.  See HPI for further details.   All other systems are negative.     PAST MEDICAL HISTORY  Past Medical History:   Diagnosis Date    Hypertension     Migraine        FAMILY HISTORY  Family History   Problem Relation Age of Onset    No Known Problems Mother     Stroke Father        SOCIAL HISTORY   reports that she has never smoked. She has never used smokeless tobacco. She reports current alcohol use. She reports that she does not use  "drugs.    SURGICAL HISTORY  History reviewed. No pertinent surgical history.    CURRENT MEDICATIONS  Home Medications       Reviewed by Miriam Longo R.N. (Registered Nurse) on 10/02/22 at 0126  Med List Status: Partial     Medication Last Dose Status   lidocaine (XYLOCAINE) 2 % Solution  Active                    ALLERGIES  No Known Allergies    PHYSICAL EXAM  VITAL SIGNS: BP (!) 96/56   Pulse (!) 128   Temp 36.6 °C (97.8 °F) (Temporal)   Resp 20   Ht 1.753 m (5' 9\")   Wt 83.9 kg (185 lb)   SpO2 98%   BMI 27.32 kg/m²    Gen: Alert  HENT: ATNC  Eyes: Normal conjunctiva  Neck: trachea midline  Resp: no respiratory distress  CV: No JVD, RRR  Abd: non-distended, soft, nontender  Ext: No deformities  Psych: normal mood  Neuro: speech fluent         LABS  Labs Reviewed   HEMOGLOBIN AND HEMATOCRIT - Abnormal; Notable for the following components:       Result Value    Hemoglobin 11.5 (*)     Hematocrit 33.8 (*)     All other components within normal limits   COD (ADULT)   ABO RH CONFIRM            COURSE & MEDICAL DECISION MAKING  Pertinent Labs & Imaging studies reviewed. (See chart for details)    I received a call for patient transfer for concern for retained products of conception.  The patient did have a hemoglobin drop at the outside hospital from initial to repeat, as well as an ultrasound that shows heterogenous endometrium concerning for retained products of conception.  The patient is currently well-appearing in the emergency department with no order of active bleeding.    I discussed the case with Dr. Metcalf, OB/GYN, who recommended the patient follow-up in clinic if she is well-appearing, hemodynamically stable, with no further bleeding.  I discussed this with the patient and she is somewhat uncomfortable with discharge home given the amount of bleeding that she had just undergone.    We did attempt orthostatics on the patient, she became very dizzy and lightheaded with tachycardia and " hypotension.  I again discussed the case with Dr. Metcalf, who agrees to evaluate the patient emergency department.  Will provide IV fluids, repeat H&H.    Hydration: Patient given IV fluids for orthostatic hypotension.    Patient will be transferred to OR by Dr. Ram in guarded condition    Appropriate PPE were worn at this encounter.     FINAL IMPRESSION  1. Vaginal bleeding    2. Acute blood loss anemia    3. Retained products of conception after miscarriage           This dictation was created using voice recognition software. The accuracy of the dictation is limited to the abilities of the software. I expect there may be some errors of grammar and possibly content. The nursing notes were reviewed and certain aspects of this information were incorporated into this note.

## 2022-10-02 NOTE — ANESTHESIA PREPROCEDURE EVALUATION
Case: [id number] Date/Time: 10/02/22 0330    Procedure: DILATION AND EVACUATION, UTERUS    Pre-op diagnosis: Retained Products of Conception    Location: [facility] OR 09 / SURGERY [facility]    Surgeons: Tamiko Metcalf M.D.          Relevant Problems   Other   (positive) Retained products of conception after miscarriage       Physical Exam    Airway   Mallampati: II  TM distance: >3 FB  Neck ROM: full       Cardiovascular - normal exam  Rhythm: regular  Rate: normal  (-) murmur     Dental - normal exam           Pulmonary - normal exam  Breath sounds clear to auscultation     Abdominal    Neurological - normal exam                 Anesthesia Plan    ASA 1       Plan - general       Airway plan will be LMA          Induction: intravenous    Postoperative Plan: Postoperative administration of opioids is intended.    Pertinent diagnostic labs and testing reviewed    Informed Consent:    Anesthetic plan and risks discussed with patient.

## 2022-10-02 NOTE — ANESTHESIA PROCEDURE NOTES
Airway    Date/Time: 10/2/2022 4:26 AM  Performed by: Silas Lopez M.D.  Authorized by: Silas Lopez M.D.     Location:  OR  Urgency:  Elective  Indications for Airway Management:  Anesthesia      Spontaneous Ventilation: absent    Sedation Level:  Deep  Preoxygenated: Yes    Final Airway Type:  Supraglottic airway  Final Supraglottic Airway:  Standard LMA    SGA Size:  4  Number of Attempts at Approach:  1

## 2022-10-02 NOTE — ED NOTES
HPI Initial (Portions of this note are brought forward from Jovanni Scott MD initial note on ]10/18/2019; reviewed and edited as appropriate):  Irene Arzate is a 50 year old female with chronic cervical neck pain, cervical radiculopathy, lumbar back pain and lumbar radiculopathy pain as described below.  Referred by Leon Gomez MD for consultation and management.     Pain Score (0-10):  Current 9/10; Worst 10/10;  Least 6/10;  Average 8/10;  Acceptable 7 /10  Duration:  2007 - cervical, 2012 - lumbar  Context of pain:  Pain started without inciting event or trauma. She had anterior and posterior 1-level fusion in 2012 with relief of pain. She reports going back to work in 2013 with aggravation of neck requiring revision of fusion. She has had persistent neck pain and bilaterally arm radiculopathy since that time. Her lumbar back pain started after her first cervical fusion without inciting event or trauma. Her pain radiates down the left posterior lower extremity to the foot.   Location:  Cervical spine, lumbar spine  Radiation:  Cervical - bilateral UE, Lumbar - left posterior LE  Quality:  Aching, shooting, sharp, burning, tiring, numb, unbearable, throbbing, stabbing, tender, exhausting, pressure, nagging, miserable, cramping  Improves:  Laying in bed  Exacerbates:?  Moving, activity      Numbness/Tingling:  Left foot, bilateral hands  Weakness:  Bilateral arms, left leg (ambulates with cane0  Sleep:  <4 hours  Mood:  depressed, sad   ?  Bowel and bladder - Denies new onset incontinence, or saddle anesthesia.    Interventions (from last visit with updates)  1.  Injections:    · Boxtox for migraines - every 8 weeks by neurology  · Nerve blocks   · Trigger point injections  · 1/8/2020 Medial Branch/ Dorsal Ramus Block, Left, Lumbar, L3, Second Diagnostic Block 50% relief   · 1/13/2020 Interlaminar Epidural Steroid Injection, Cervical, C7/T1   · 3/3/2020 Radiofrequency Ablation Medial Branch/Dorsal Ramus,  PT IS AAOX4, NAD. PT IS NOW GONG TO U/S.    LEFT, Lumbar L3,L4,L5 35%   2. Physical Therapy: Not in a structured PT program.  3. Surgeries (Spine, Joint, related to pain):   · Cervical fusion 2012  · Laminectomy 2013  · 2010 ARTHROSCOPIC DEBRIDEMENT AND OR CHONDROPLASTY KNEE  · 2020 Radiofrequency Ablation Medial Branch/Dorsal Ramus, Right,Lumbar,L3,L4,L5s (33% pain relief)  · 3/10/2020Radiofrequency  Ablation Medial Branch/Dorsal Ramus, LEFT, Lumbar L3 , L4,L5 38%  4. Medications (pain/mood/depression): (with doses, duration of use, side effects, etc...)  Current  ? Diclofenac 1% gel   ? Hydrocodone  MG take 1 tablet  every 6 hours PRN (Emmanuelle)  ? Lyrica 200 MG caps, 1 cap TID  ? sumatriptan 100 MG (Bubolz)  ? Topamax 100 MG tab (Bubolz)  ? Cymbalta 60 MG caps, 1 tab BID (Emmanuelle)  ? Meloxicam 15 mg daily (Emmanuelle)  ? Nortriptyline 25 mg nightly      Previous  Oxycodone   · Mirapex   Percocet 7.5-325 MG tab, 1 tab BID PRN    PE, on 10/14/2020     ORT: 1, on 10/14/2020         If on contract (update)  • Urine tox screen: 2020   • Prescription Drug Monitoring Program verified this visit.  • Opioid contract: 10/14/2020     GENERAL: Denies fatigue, fever, loss of appetite, or unexplained weight loss.  SKIN: Denies rash, itching, or jaundice.  ?HEENT: Denies blurred vision, glaucoma, or hoarseness.  ??CV: Denies palpitations or leg swelling?.  ?PULM: Denies shortness of breath or cough.  ?GI: Denies heartburn, loss of bowel control, or constipation.  ?: Denies loss of bladder control or urinary retention. ?  MSK: Denies joint pain, joint swelling, back pain, neck pain, pain shooting to arm/leg, or joint stiffness.  ??HEME/LYMPH: Denies unusual bleeding or easy bruising.  ?NEURO: Denies headache, numbness, weakness, memory loss, seizures, or frequent dizziness/lightheadedness.   ?PSYCH: Denies anxiety, insomnia, panic attacks, or rage.  Above bolded signs/symptoms are positive. All others are negative.

## 2022-10-02 NOTE — ED TRIAGE NOTES
"Chief Complaint   Patient presents with    Vaginal Bleeding     Transfer from [facility], miscarriage x6 weeks ago, vag bleeding began 10/1 at 1900, low BP        BIB [facility] for above complaint. Low HGB 10.8 EMS vitals 106/70 HR 98 98% RA      /76   Pulse 81   Temp 36.6 °C (97.8 °F) (Temporal)   Resp 16   Ht 1.753 m (5' 9\")   Wt 83.9 kg (185 lb)   SpO2 97%   BMI 27.32 kg/m²      "

## 2022-10-02 NOTE — OR NURSING
Pt's ride arrived. Pt out of PACU via wheelchair to ride/friend's car. IV d/c'd, tip intact. All personal belongings with pt.

## 2022-10-02 NOTE — ANESTHESIA TIME REPORT
Anesthesia Start and Stop Event Times     Date Time Event    10/2/2022 0318 Ready for Procedure     0423 Anesthesia Start     0453 Anesthesia Stop        Responsible Staff  10/02/22    Name Role Begin End    Silas Lopez M.D. Anesth [id number]        Overtime Reason:  no overtime (within assigned shift)    Comments:

## 2022-10-02 NOTE — H&P
History and Physical Exam    CC: Vaginal bleeding    History of present illness: 37 y.o.  presents with above chief complaint. She receives OB/Gyn care with my partner Dr. Fung. The patient is s/p misoprostol termination of a 6 week missed  around a month ago. She has had continued daily vaginal bleeding (spotting or heavier flow w/ clots) since the pregnancy termination. Tonight she had heavy bleeding and clots. She was taken to [facility] then transferred here for gynecology care. She does feel some dizziness/lightheadedness upon standing. Bleeding is currently light. No abdominal or pelvic pain. She does report pain w/ intercourse. No fevers/chills. No N/V/D or constipation.    Review of systems:  Pertinent positives documented in HPI and all other systems reviewed & are negative    POBHx: full term  x 1, SAB x 2    PGYNHx: no hx STIs, no abnl paps    All PMH, PSH, allergies, social history and FH reviewed and updated today:  Past Medical History:   Diagnosis Date    Hypertension     Migraine      PSH: History reviewed. No pertinent surgical history.    Meds: PNV    Allergies: No Known Allergies    Social History     Socioeconomic History    Marital status:      Spouse name: Not on file    Number of children: Not on file    Years of education: Not on file    Highest education level: Not on file   Occupational History    Not on file   Tobacco Use    Smoking status: Never    Smokeless tobacco: Never   Vaping Use    Vaping Use: Never used   Substance and Sexual Activity    Alcohol use: Yes     Comment: occ    Drug use: No    Sexual activity: Yes     Partners: Male     Birth control/protection: Pill   Other Topics Concern    Not on file   Social History Narrative    Not on file     Social Determinants of Health     Financial Resource Strain: Not on file   Food Insecurity: Not on file   Transportation Needs: Not on file   Physical Activity: Not on file   Stress: Not on file  "  Social Connections: Not on file   Intimate Partner Violence: Not on file   Housing Stability: Not on file       Family History   Problem Relation Age of Onset    No Known Problems Mother     Stroke Father        Physical exam:  BP (!) 96/56   Pulse (!) 128   Temp 36.6 °C (97.8 °F) (Temporal)   Resp 20   Ht 1.753 m (5' 9\")   Wt 83.9 kg (185 lb)   SpO2 98%     General:appears stated age, is in no apparent distress, is well developed and well nourished  Head: normocephalic, non-tender  Neck: neck is supple  Lungs: Normal respiratory effort. No distress  Abdomen: Bowel sounds positive, nondistended, soft, nontender x4, no rebound or guarding. No organomegaly. No masses.  Female GYN: deferred  Skin: No rashes, or ulcers or lesions seen  Ext: NT bilaterally, no C/C/E  Psychiatric: Patient shows appropriate affect, is alert and oriented x3, intact judgment and insight.    PELVIC ULTRASOUND 10/1/2022 9:40 PM     HISTORY/REASON FOR EXAM:  Vaginal Bleeding; positive pregnancy test        TECHNIQUE/EXAM DESCRIPTION:  Transabdominal and transvaginal pelvic ultrasound.     COMPARISON:   None     FINDINGS:  Both transabdominal and transvaginal scanning were performed to optimally visualize the pelvis.     UTERUS:  The uterus measures 4.07 cm x 6.58 cm x 4.51 cm.  The uterine myometrium is within normal limits.  The endometrial echo complex measures 1.03 cm. It appears heterogenous and vascularized.  The endometrium is unremarkable in appearance and thickness for age and menstrual status.  There is a tiny nabothian cyst.     OVARIES:  The right ovary measures 2.75 cm x 1.85 cm x 2.04 cm. Duplex Doppler examination of the right ovary shows normal waveforms.     The left ovary measures 2.75 cm x 1.29 cm x 2.43 cm. Duplex Doppler examination of the left ovary shows normal waveforms.     There is no free fluid seen.     IMPRESSION:     1.  No intrauterine gestation is identified.  Differential considerations include early " gestation, missed , and nonvisualized ectopic pregnancy.  Close clinical monitoring is therefore warranted with followup ultrasound or serial Beta HCG levels.  2.  Heterogenous vascularized appearance of the endometrium is suspicious for retained products of conception, less likely    Recent Labs     10/01/22  2008 10/01/22  2312 10/02/22  0214   WBC 7.8  --   --    RBC 4.04*  --   --    HEMOGLOBIN 12.3 10.8* 11.5*   HEMATOCRIT 35.9* 32.1* 33.8*   MCV 88.9  --   --    MCH 30.4  --   --    RDW 39.6  --   --    PLATELETCT 180  --   --    MPV 10.2  --   --    NEUTSPOLYS 63.70  --   --    LYMPHOCYTES 29.90  --   --    MONOCYTES 4.10  --   --    EOSINOPHILS 1.40  --   --    BASOPHILS 0.50  --   --        Assessment:  36yo  with retained products of conception after 6 week miscarriage  Vaginal bleeding  Acute blood loss anemia  Rh positive    Plan:  Ultrasound findings reviewed in detail with patient, imaging concerning for retained products of conception.  Recommend surgical evacuation of uterine contents   Planned procedure: Suction dilation and evacuation  NPO  OR team notified  Transfer to [facility] and proceed to surgery when team ready      Discussed with the patient the risks of surgery. The risks include pain, infection, bleeding, scarring, uterine perforation, damage to other organs in the area of operation, anesthesia complications, and death. Specifically organs that can be damaged are bowel, bladder, ureters. Patient had the opportunity to ask questions regarding procedures. All questions answered to the patient's satisfaction. Pt agrees to proceed with surgery.

## 2022-10-02 NOTE — ED NOTES
Did orthostatics. Pt reports dizzy and light headed when standing. Unable to ambulate at this time. Provider aware.

## 2022-10-02 NOTE — ED TRIAGE NOTES
"Chief Complaint   Patient presents with    Abdominal Pain    Dizziness    Vaginal Bleeding    Near Syncopal     PT IS AAOX4, NAD. PT CAME TO THE ER WITH EMS FROM HOME. AS PER PT \" I HAD A MISCARRIAGE 6 WEEKS AND SINCE THAN HAS BEING  HAVING VAG. BLEEDING, TODAY IT WAS WORSE AND I STARTED TO PASS CLOTS\". AS PER EMS PTS B/P WAS 78/30 AT HOME\".    PT NOW DENIES ANY PAIN AT THIS TIME, + DIZZINESS, + BLEEDING.    /C    /72   Pulse 87   Temp 37 °C (98.6 °F) (Temporal)   Resp 18   Ht 1.753 m (5' 9\")   Wt 84 kg (185 lb 3 oz)   SpO2 98% /S  "

## 2022-10-02 NOTE — ED NOTES
PT IS NOW ACTIVE BLEEDING AND CLOTS ARE PRESENT. PT WAS CLEANED AND A NEW PAD PLACED. ER PROVIDER WAS TOLD.

## 2022-10-02 NOTE — OR NURSING
0452: Patient arrived in PACU, VSS.   0519: Medicated for pain per MAR.     Patient requested to wait a bit to call her friend for ride home.    0645: Went over all discharge paperwork with patient, answered all questions.

## 2022-10-02 NOTE — ED PROVIDER NOTES
"ED Provider Note    CHIEF COMPLAINT  Chief Complaint   Patient presents with    Abdominal Pain    Dizziness    Vaginal Bleeding    Near Syncopal     PT IS AAOX4, NAD. PT CAME TO THE ER WITH EMS FROM HOME. AS PER PT \" I HAD A MISCARRIAGE 6 WEEKS AND SINCE THAN HAS BEING  HAVING VAG. BLEEDING, TODAY IT WAS WORSE AND I STARTED TO PASS BLOOD CLOTS\". AS PER EMS PTS B/P WAS 78/30 AT HOME\".    PT NOW DENIES ANY PAIN AT THIS TIME, + DIZZINESS, + BLEEDING.        HPI  Nhi Napier is a 37 y.o. female who presents with heavy vaginal bleeding over the last 1 hour.  Patient states she had a miscarriage about a month ago.  She is a G3, P2 with 1 live birth who went to her 8-week pregnancy checkup with Dr. Garcia on September 1 and was found to have a 6-week fetal demise on ultrasound.  She \"took the pills\" and then the next day past the products of conception.  She went back to the OB/GYN physicians office on September 2 and had an ultrasound which showed a clot in the uterus but otherwise everything looks like it had passed.  Patient has had some intermittent vaginal bleeding since her miscarriage.  She said there is been a couple days where she has not bled, but overall she has had some light bleeding for most of the month.  Today she was just sitting on the couch when she had a big gush of blood come out with passage of lots of clots.  As a result of all the blood she got very lightheaded.  She called EMS.  EMS found her on the toilet with vaginal bleeding with a blood pressure of 78/30  She does not feel dizzy or lightheaded right now.  She passed 1 clot earlier this morning and only had a little bit of light bleeding throughout the day until this evening when the bleeding was significant and profuse with multiple large clots passed.  She said she soaked a postpartum pad and a Depends in the hour prior to arrival that she was bleeding.   No abdominal or pelvic pain/cramping.  She is not on any blood " "thinners.  She is not bleeding from any other areas.    REVIEW OF SYSTEMS  See HPI for further details. All other systems are negative.    PAST MEDICAL HISTORY  Past Medical History:   Diagnosis Date    Hypertension     Migraine        FAMILY HISTORY  Family History   Problem Relation Age of Onset    No Known Problems Mother     Stroke Father        SOCIAL HISTORY  Social History     Socioeconomic History    Marital status:    Tobacco Use    Smoking status: Never    Smokeless tobacco: Never   Vaping Use    Vaping Use: Never used   Substance and Sexual Activity    Alcohol use: Yes     Comment: occ    Drug use: No    Sexual activity: Yes     Partners: Male     Birth control/protection: Pill       SURGICAL HISTORY  No past surgical history on file.    CURRENT MEDICATIONS  Home Medications    **Home medications have not yet been reviewed for this encounter**         ALLERGIES  No Known Allergies    PHYSICAL EXAM  VITAL SIGNS: /72   Pulse 87   Temp 37 °C (98.6 °F) (Temporal)   Resp 18   Ht 1.753 m (5' 9\")   Wt 84 kg (185 lb 3 oz)   SpO2 99%   BMI 27.35 kg/m²      Constitutional: Well developed, well nourished; No acute distress; Non-toxic appearance.   HENT: Normocephalic, atraumatic; Bilateral external ears normal; oropharyngeal examination deferred due to COVID-19 outbreak and lack of oropharyngeal complaint  Eyes: PERRL, EOMI, Conjunctiva normal. No discharge.   Neck:  Supple, nontender midline; No stridor; No nuchal rigidity.   Lymphatic: No cervical lymphadenopathy noted.   Cardiovascular: Regular rate and rhythm without murmurs, rubs, or gallop.   Thorax & Lungs: No respiratory distress, breath sounds clear to auscultation bilaterally without wheezing, rales or rhonchi. Nontender chest wall. No crepitus or subcutaneous air  Abdomen: Soft, nontender, bowel sounds normal. No obvious masses; No pulsatile masses; no rebound, guarding, or peritoneal signs.   Skin: Good color; warm and dry without " rash or petechia.  Back: Nontender, No CVA tenderness.   Genitalia: External genitalia appear normal, No masses or lesions. No discharge.  There is a small clot in the vaginal vault.  There is small amount of blood in the vaginal vault.  There is dried blood on the inner aspect of the patient's bilateral thighs and on the labia majora.  There is no significant oozing of blood or flowing of blood coming from the cervical os.  Os is closed.  No cervical motion tenderness.  No adnexal tenderness or obvious masses.  Extremities: Distal radial, dorsalis pedis, posterior tibial pulses are equal bilaterally; No edema; Nontender calves or saphenous, No cyanosis, No clubbing.   Musculoskeletal: Good range of motion in all major joints. No tenderness to palpation or major deformities noted.   Neurologic: Alert & oriented x 4, clear speech      RADIOLOGY/PROCEDURES  US-PELVIC COMPLETE (TRANSABDOMINAL/TRANSVAGINAL) (COMBO)   Final Result      1.  No intrauterine gestation is identified.  Differential considerations include early gestation, missed , and nonvisualized ectopic pregnancy.  Close clinical monitoring is therefore warranted with followup ultrasound or serial Beta HCG levels.   2.  Heterogenous vascularized appearance of the endometrium is suspicious for retained products of conception, less likely           COURSE & MEDICAL DECISION MAKING  Pertinent Labs & Imaging studies reviewed. (See chart for details)    Results for orders placed or performed during the hospital encounter of 10/01/22   CBC WITH DIFFERENTIAL   Result Value Ref Range    WBC 7.8 4.8 - 10.8 K/uL    RBC 4.04 (L) 4.20 - 5.40 M/uL    Hemoglobin 12.3 12.0 - 16.0 g/dL    Hematocrit 35.9 (L) 37.0 - 47.0 %    MCV 88.9 81.4 - 97.8 fL    MCH 30.4 27.0 - 33.0 pg    MCHC 34.3 33.6 - 35.0 g/dL    RDW 39.6 35.9 - 50.0 fL    Platelet Count 180 164 - 446 K/uL    MPV 10.2 9.0 - 12.9 fL    Neutrophils-Polys 63.70 44.00 - 72.00 %    Lymphocytes 29.90 22.00 -  41.00 %    Monocytes 4.10 0.00 - 13.40 %    Eosinophils 1.40 0.00 - 6.90 %    Basophils 0.50 0.00 - 1.80 %    Immature Granulocytes 0.40 0.00 - 0.90 %    Nucleated RBC 0.00 /100 WBC    Neutrophils (Absolute) 4.99 2.00 - 7.15 K/uL    Lymphs (Absolute) 2.34 1.00 - 4.80 K/uL    Monos (Absolute) 0.32 0.00 - 0.85 K/uL    Eos (Absolute) 0.11 0.00 - 0.51 K/uL    Baso (Absolute) 0.04 0.00 - 0.12 K/uL    Immature Granulocytes (abs) 0.03 0.00 - 0.11 K/uL    NRBC (Absolute) 0.00 K/uL   COMP METABOLIC PANEL   Result Value Ref Range    Sodium 138 135 - 145 mmol/L    Potassium 3.3 (L) 3.6 - 5.5 mmol/L    Chloride 105 96 - 112 mmol/L    Co2 21 20 - 33 mmol/L    Anion Gap 12.0 7.0 - 16.0    Glucose 126 (H) 65 - 99 mg/dL    Bun 17 8 - 22 mg/dL    Creatinine 0.83 0.50 - 1.40 mg/dL    Calcium 8.4 8.4 - 10.2 mg/dL    AST(SGOT) 11 (L) 12 - 45 U/L    ALT(SGPT) 11 2 - 50 U/L    Alkaline Phosphatase 55 30 - 99 U/L    Total Bilirubin 0.6 0.1 - 1.5 mg/dL    Albumin 4.2 3.2 - 4.9 g/dL    Total Protein 6.1 6.0 - 8.2 g/dL    Globulin 1.9 1.9 - 3.5 g/dL    A-G Ratio 2.2 g/dL   RH TYPE FOR RHOGAM FROM E.D.   Result Value Ref Range    Emergency Department Rh Typing POS     Number Of Rh Doses Indicated ZERO    HCG QUAL SERUM   Result Value Ref Range    Beta-Hcg Qualitative Serum Positive (A) Negative   ESTIMATED GFR   Result Value Ref Range    GFR (CKD-EPI) 94 >60 mL/min/1.73 m 2   BETA-HCG QUANTITATIVE SERUM   Result Value Ref Range    Bhcg 8.7 0.0 - 10.0 mIU/mL   HEMOGLOBIN AND HEMATOCRIT   Result Value Ref Range    Hemoglobin 10.8 (L) 12.0 - 16.0 g/dL    Hematocrit 32.1 (L) 37.0 - 47.0 %        2120: Patient just coughed and passed a large clot with increased bleeding.  The clot is about the size of a lemon.    2330:  Hemoglobin is gone from 12.3 down to 10.8 in the last several hours.    2340 discussed with Dr. Smith, ER physician at [facility].  He will kindly sent the patient in transfer for gynecology consult.    Patient presents to the  ER with complaint of a sudden onset of severe vaginal bleeding with passage of large clots which began about an hour prior to arrival.  Patient had a miscarriage about a month ago.  She has been having some spotting on and off over the last month.  This morning she passed a large clot.  She spotted throughout the day until she started bleeding heavily this evening.  Upon EMS arrival she was lightheaded and blood pressure was 70/30.  She had soaked through a large postpartum pad and a depends undergarment in less than an hour.  She continues to bleed here in the ER.  She has passed several large clots the size of large alma here in the ER.  Hemoglobin dropped from 12.3-10.8 in the few hours that she has been here.  Ultrasound reveals what looks to be retained products of conception.  She does not have fever.  No purulent vaginal discharge.  She had a faintly positive qualitative pregnancy test so a quantitative was done.  Quantitative value was 8.7.  I suspect this is residual from her pregnancy a month ago when she miscarried.  She is Rh+.  At this time I think patient needs to be transferred down to [facility] for gynecology consult.  We do not have any gynecology here at this facility.  I spoke with Dr. Smith, ER physician on duty at [facility] and he will can accept the patient in transfer for gynecology consultation and management.    I verified that the patient was wearing a mask and I was wearing appropriate PPE every time I entered the room. The patient's mask was on the patient at all times during my encounter      FINAL IMPRESSION  1. Retained products of conception after miscarriage Acute       2. Vaginal bleeding Acute       3. Anemia, unspecified type Acute             This dictation has been created using voice recognition software. The accuracy of the dictation is limited by the abilities of the software. I expect there may be some errors of grammar and possibly content. I made every attempt to  manually correct the errors within my dictation. However, errors related to voice recognition software may still exist and should be interpreted within the appropriate context.     Electronically signed by: Sheree Ambrose M.D., 10/1/2022 8:33 PM

## 2022-10-25 LAB — PATHOLOGY CONSULT NOTE: NORMAL

## 2023-05-14 ENCOUNTER — OFFICE VISIT (OUTPATIENT)
Dept: URGENT CARE | Facility: CLINIC | Age: 38
End: 2023-05-14
Payer: COMMERCIAL

## 2023-05-14 VITALS
HEIGHT: 70 IN | BODY MASS INDEX: 27.2 KG/M2 | HEART RATE: 84 BPM | TEMPERATURE: 97.8 F | RESPIRATION RATE: 18 BRPM | SYSTOLIC BLOOD PRESSURE: 104 MMHG | OXYGEN SATURATION: 96 % | DIASTOLIC BLOOD PRESSURE: 62 MMHG | WEIGHT: 190 LBS

## 2023-05-14 DIAGNOSIS — J02.0 STREP PHARYNGITIS: ICD-10-CM

## 2023-05-14 LAB
INT CON NEG: NORMAL
INT CON POS: NORMAL
S PYO AG THROAT QL: POSITIVE

## 2023-05-14 PROCEDURE — 3078F DIAST BP <80 MM HG: CPT | Performed by: REGISTERED NURSE

## 2023-05-14 PROCEDURE — 99213 OFFICE O/P EST LOW 20 MIN: CPT | Performed by: REGISTERED NURSE

## 2023-05-14 PROCEDURE — 3074F SYST BP LT 130 MM HG: CPT | Performed by: REGISTERED NURSE

## 2023-05-14 PROCEDURE — 87880 STREP A ASSAY W/OPTIC: CPT | Performed by: REGISTERED NURSE

## 2023-05-14 RX ORDER — GUAIFENESIN 600 MG/1
600 TABLET, EXTENDED RELEASE ORAL EVERY 12 HOURS
Status: ON HOLD | COMMUNITY
End: 2024-01-17

## 2023-05-14 RX ORDER — AMOXICILLIN 500 MG/1
500 CAPSULE ORAL 2 TIMES DAILY
Qty: 20 CAPSULE | Refills: 0 | Status: SHIPPED | OUTPATIENT
Start: 2023-05-14 | End: 2023-05-24

## 2023-05-14 ASSESSMENT — ENCOUNTER SYMPTOMS
DIZZINESS: 0
CHILLS: 0
ABDOMINAL PAIN: 0
COUGH: 0
EYE DISCHARGE: 0
SORE THROAT: 1
HEMOPTYSIS: 0
EYE PAIN: 0
FEVER: 0
HEADACHES: 0
SHORTNESS OF BREATH: 0

## 2023-05-14 ASSESSMENT — FIBROSIS 4 INDEX: FIB4 SCORE: 0.64

## 2023-05-14 NOTE — PROGRESS NOTES
Chief Complaint   Patient presents with    Other     White spots in the back of throat, pain/itchy in the back of throat, painful to swallow x 1 day, 3 weeks pregnant     HPI:   Nhi Napier is a 37 y.o. female who is presenting for sore throat starting yesterday, worse with swallowing. Also has fatigue, headache, morning cough with phlegm. No known exposures. No recent strep throat. Possibly 3 weeks pregnant. No hospitalizations for strep.    Denies difficulty opening mouth, muffled voice, drooling, decreased ROM neck    Pertinent history: Pregnant  Immunizations: Reported Current    Social History     Tobacco Use    Smoking status: Never    Smokeless tobacco: Never   Vaping Use    Vaping Use: Never used   Substance Use Topics    Alcohol use: Not Currently     Comment: occ    Drug use: No     No Known Allergies    Patient Active Problem List    Diagnosis Date Noted    Retained products of conception after miscarriage 10/02/2022    Acute blood loss as cause of postoperative anemia 03/25/2020     Current Outpatient Medications Ordered in Epic   Medication Sig Dispense Refill    guaiFENesin ER (MUCINEX) 600 MG TABLET SR 12 HR Take 600 mg by mouth every 12 hours.      amoxicillin (AMOXIL) 500 MG Cap Take 1 Capsule by mouth 2 times a day for 10 days. 20 Capsule 0    Prenatal Vit-Fe Fumarate-FA (PRENATAL PO) Take 1 Tablet by mouth every day.       No current [facility]-ordered facility-administered medications on file.     Review of Systems   Constitutional:  Positive for malaise/fatigue. Negative for chills and fever.   HENT:  Positive for congestion and sore throat. Negative for ear pain.    Eyes:  Negative for pain and discharge.   Respiratory:  Negative for cough, hemoptysis and shortness of breath.    Cardiovascular:  Negative for chest pain.   Gastrointestinal:  Negative for abdominal pain.   Skin:  Negative for rash.   Neurological:  Negative for dizziness and headaches.      Vitals:    05/14/23  0941   BP: 104/62   Pulse: 84   Resp: 18   Temp: 36.6 °C (97.8 °F)   SpO2: 96%     Physical Exam  Vitals and nursing note reviewed.   Constitutional:       General: She is not in acute distress.     Appearance: Normal appearance. She is well-developed. She is not ill-appearing, toxic-appearing or diaphoretic.   HENT:      Head: Normocephalic and atraumatic.      Right Ear: Tympanic membrane and ear canal normal.      Left Ear: Tympanic membrane and ear canal normal.      Nose: Nose normal. No congestion or rhinorrhea.      Mouth/Throat:      Mouth: Mucous membranes are moist.      Pharynx: Posterior oropharyngeal erythema present. No oropharyngeal exudate.      Tonsils: No tonsillar exudate. 0 on the right. 0 on the left.   Eyes:      General: No scleral icterus.        Right eye: No discharge.         Left eye: No discharge.      Conjunctiva/sclera: Conjunctivae normal.   Cardiovascular:      Rate and Rhythm: Normal rate and regular rhythm.      Pulses: Normal pulses.      Heart sounds: Normal heart sounds.   Pulmonary:      Effort: Pulmonary effort is normal. No respiratory distress.      Breath sounds: Normal breath sounds. No wheezing, rhonchi or rales.   Musculoskeletal:      Cervical back: Normal range of motion and neck supple.      Right lower leg: No edema.      Left lower leg: No edema.   Lymphadenopathy:      Cervical: No cervical adenopathy.   Skin:     General: Skin is warm and dry.   Neurological:      General: No focal deficit present.      Mental Status: She is alert and oriented to person, place, and time. Mental status is at baseline.   Psychiatric:         Mood and Affect: Mood normal.       Assessment/Plan:  1. Strep pharyngitis  POCT Rapid Strep A    amoxicillin (AMOXIL) 500 MG Cap      Vital signs are WNL. Exam is unremarkable, some slight erythema in posterior OP. Rapid Strep positive.  No red flag signs or symptoms.  Patient is pregnant, will provide amoxicillin.  Discussed Tylenol for pain  control and salt water gargling.  Throw away toothbrush after 48 hours.  Monitor symptoms.  Adequate hydration.    Return to clinic or go to ED if symptoms worsen or persist. Indications for ED discussed at length. Patient/Parent/Guardian voices understanding. Follow-up with your primary care provider in 3-5 days. Red flag symptoms discussed. All side effects of medication discussed including allergic response, GI upset, tendon injury, rash, sedation etc.    I personally reviewed prior external notes and test results pertinent to today's visit as well as additional imaging and testing completed in clinic today.     Please note that this dictation was created using voice recognition software. I have made every reasonable attempt to correct obvious errors, but I expect that there are errors of grammar and possibly content that I did not discover before finalizing the note.

## 2024-01-16 ENCOUNTER — ANESTHESIA EVENT (OUTPATIENT)
Dept: ANESTHESIOLOGY | Facility: MEDICAL CENTER | Age: 39
End: 2024-01-16
Payer: COMMERCIAL

## 2024-01-16 ENCOUNTER — APPOINTMENT (OUTPATIENT)
Dept: OBGYN | Facility: MEDICAL CENTER | Age: 39
End: 2024-01-16
Attending: OBSTETRICS & GYNECOLOGY
Payer: COMMERCIAL

## 2024-01-16 ENCOUNTER — HOSPITAL ENCOUNTER (INPATIENT)
Facility: MEDICAL CENTER | Age: 39
LOS: 1 days | End: 2024-01-17
Attending: OBSTETRICS & GYNECOLOGY | Admitting: OBSTETRICS & GYNECOLOGY
Payer: COMMERCIAL

## 2024-01-16 ENCOUNTER — ANESTHESIA (OUTPATIENT)
Dept: ANESTHESIOLOGY | Facility: MEDICAL CENTER | Age: 39
End: 2024-01-16
Payer: COMMERCIAL

## 2024-01-16 LAB
BASOPHILS # BLD AUTO: 0.2 % (ref 0–1.8)
BASOPHILS # BLD: 0.02 K/UL (ref 0–0.12)
EOSINOPHIL # BLD AUTO: 0.1 K/UL (ref 0–0.51)
EOSINOPHIL NFR BLD: 1.1 % (ref 0–6.9)
ERYTHROCYTE [DISTWIDTH] IN BLOOD BY AUTOMATED COUNT: 46.7 FL (ref 35.9–50)
HCT VFR BLD AUTO: 33.8 % (ref 37–47)
HGB BLD-MCNC: 11.6 G/DL (ref 12–16)
HOLDING TUBE BB 8507: NORMAL
IMM GRANULOCYTES # BLD AUTO: 0.07 K/UL (ref 0–0.11)
IMM GRANULOCYTES NFR BLD AUTO: 0.8 % (ref 0–0.9)
LYMPHOCYTES # BLD AUTO: 1.48 K/UL (ref 1–4.8)
LYMPHOCYTES NFR BLD: 16.8 % (ref 22–41)
MCH RBC QN AUTO: 31.3 PG (ref 27–33)
MCHC RBC AUTO-ENTMCNC: 34.3 G/DL (ref 32.2–35.5)
MCV RBC AUTO: 91.1 FL (ref 81.4–97.8)
MONOCYTES # BLD AUTO: 0.57 K/UL (ref 0–0.85)
MONOCYTES NFR BLD AUTO: 6.5 % (ref 0–13.4)
NEUTROPHILS # BLD AUTO: 6.59 K/UL (ref 1.82–7.42)
NEUTROPHILS NFR BLD: 74.6 % (ref 44–72)
NRBC # BLD AUTO: 0 K/UL
NRBC BLD-RTO: 0 /100 WBC (ref 0–0.2)
PLATELET # BLD AUTO: 149 K/UL (ref 164–446)
PMV BLD AUTO: 11.3 FL (ref 9–12.9)
RBC # BLD AUTO: 3.71 M/UL (ref 4.2–5.4)
T PALLIDUM AB SER QL IA: NORMAL
WBC # BLD AUTO: 8.8 K/UL (ref 4.8–10.8)

## 2024-01-16 PROCEDURE — 304965 HCHG RECOVERY SERVICES

## 2024-01-16 PROCEDURE — A9270 NON-COVERED ITEM OR SERVICE: HCPCS | Performed by: OBSTETRICS & GYNECOLOGY

## 2024-01-16 PROCEDURE — 700105 HCHG RX REV CODE 258: Performed by: OBSTETRICS & GYNECOLOGY

## 2024-01-16 PROCEDURE — 0HQ9XZZ REPAIR PERINEUM SKIN, EXTERNAL APPROACH: ICD-10-PCS | Performed by: OBSTETRICS & GYNECOLOGY

## 2024-01-16 PROCEDURE — 700101 HCHG RX REV CODE 250: Performed by: STUDENT IN AN ORGANIZED HEALTH CARE EDUCATION/TRAINING PROGRAM

## 2024-01-16 PROCEDURE — 303615 HCHG EPIDURAL/SPINAL ANESTHESIA FOR LABOR

## 2024-01-16 PROCEDURE — 86780 TREPONEMA PALLIDUM: CPT

## 2024-01-16 PROCEDURE — 59409 OBSTETRICAL CARE: CPT

## 2024-01-16 PROCEDURE — 36415 COLL VENOUS BLD VENIPUNCTURE: CPT

## 2024-01-16 PROCEDURE — 770002 HCHG ROOM/CARE - OB PRIVATE (112)

## 2024-01-16 PROCEDURE — 85025 COMPLETE CBC W/AUTO DIFF WBC: CPT

## 2024-01-16 PROCEDURE — 700111 HCHG RX REV CODE 636 W/ 250 OVERRIDE (IP): Mod: JZ | Performed by: OBSTETRICS & GYNECOLOGY

## 2024-01-16 PROCEDURE — 700102 HCHG RX REV CODE 250 W/ 637 OVERRIDE(OP): Performed by: OBSTETRICS & GYNECOLOGY

## 2024-01-16 PROCEDURE — 3E033VJ INTRODUCTION OF OTHER HORMONE INTO PERIPHERAL VEIN, PERCUTANEOUS APPROACH: ICD-10-PCS | Performed by: OBSTETRICS & GYNECOLOGY

## 2024-01-16 RX ORDER — BISACODYL 10 MG
10 SUPPOSITORY, RECTAL RECTAL PRN
Status: DISCONTINUED | OUTPATIENT
Start: 2024-01-16 | End: 2024-01-17 | Stop reason: HOSPADM

## 2024-01-16 RX ORDER — MISOPROSTOL 200 UG/1
800 TABLET ORAL
Status: DISCONTINUED | OUTPATIENT
Start: 2024-01-16 | End: 2024-01-16 | Stop reason: HOSPADM

## 2024-01-16 RX ORDER — METHYLERGONOVINE MALEATE 0.2 MG/ML
0.2 INJECTION INTRAVENOUS
Status: DISCONTINUED | OUTPATIENT
Start: 2024-01-16 | End: 2024-01-16 | Stop reason: HOSPADM

## 2024-01-16 RX ORDER — LIDOCAINE HYDROCHLORIDE AND EPINEPHRINE 15; 5 MG/ML; UG/ML
INJECTION, SOLUTION EPIDURAL
Status: COMPLETED | OUTPATIENT
Start: 2024-01-16 | End: 2024-01-16

## 2024-01-16 RX ORDER — CALCIUM CARBONATE 500 MG/1
1000 TABLET, CHEWABLE ORAL EVERY 6 HOURS PRN
Status: DISCONTINUED | OUTPATIENT
Start: 2024-01-16 | End: 2024-01-17 | Stop reason: HOSPADM

## 2024-01-16 RX ORDER — DOCUSATE SODIUM 100 MG/1
100 CAPSULE, LIQUID FILLED ORAL 2 TIMES DAILY PRN
Status: DISCONTINUED | OUTPATIENT
Start: 2024-01-16 | End: 2024-01-17 | Stop reason: HOSPADM

## 2024-01-16 RX ORDER — ROPIVACAINE HYDROCHLORIDE 2 MG/ML
INJECTION, SOLUTION EPIDURAL; INFILTRATION; PERINEURAL
Status: ACTIVE
Start: 2024-01-16 | End: 2024-01-16

## 2024-01-16 RX ORDER — IBUPROFEN 800 MG/1
800 TABLET ORAL
Status: COMPLETED | OUTPATIENT
Start: 2024-01-16 | End: 2024-01-16

## 2024-01-16 RX ORDER — ACETAMINOPHEN 500 MG
1000 TABLET ORAL EVERY 6 HOURS PRN
Status: DISCONTINUED | OUTPATIENT
Start: 2024-01-17 | End: 2024-01-17 | Stop reason: HOSPADM

## 2024-01-16 RX ORDER — SODIUM CHLORIDE, SODIUM LACTATE, POTASSIUM CHLORIDE, AND CALCIUM CHLORIDE .6; .31; .03; .02 G/100ML; G/100ML; G/100ML; G/100ML
1000 INJECTION, SOLUTION INTRAVENOUS
Status: DISCONTINUED | OUTPATIENT
Start: 2024-01-16 | End: 2024-01-16 | Stop reason: HOSPADM

## 2024-01-16 RX ORDER — ONDANSETRON 2 MG/ML
4 INJECTION INTRAMUSCULAR; INTRAVENOUS EVERY 6 HOURS PRN
Status: DISCONTINUED | OUTPATIENT
Start: 2024-01-16 | End: 2024-01-16 | Stop reason: HOSPADM

## 2024-01-16 RX ORDER — TERBUTALINE SULFATE 1 MG/ML
0.25 INJECTION, SOLUTION SUBCUTANEOUS
Status: DISCONTINUED | OUTPATIENT
Start: 2024-01-16 | End: 2024-01-16 | Stop reason: HOSPADM

## 2024-01-16 RX ORDER — EPHEDRINE SULFATE 50 MG/ML
5 INJECTION, SOLUTION INTRAVENOUS
Status: DISCONTINUED | OUTPATIENT
Start: 2024-01-16 | End: 2024-01-16 | Stop reason: HOSPADM

## 2024-01-16 RX ORDER — SODIUM CHLORIDE, SODIUM LACTATE, POTASSIUM CHLORIDE, CALCIUM CHLORIDE 600; 310; 30; 20 MG/100ML; MG/100ML; MG/100ML; MG/100ML
1000 INJECTION, SOLUTION INTRAVENOUS CONTINUOUS
Status: DISCONTINUED | OUTPATIENT
Start: 2024-01-16 | End: 2024-01-16

## 2024-01-16 RX ORDER — SODIUM CHLORIDE, SODIUM LACTATE, POTASSIUM CHLORIDE, AND CALCIUM CHLORIDE .6; .31; .03; .02 G/100ML; G/100ML; G/100ML; G/100ML
250 INJECTION, SOLUTION INTRAVENOUS PRN
Status: DISCONTINUED | OUTPATIENT
Start: 2024-01-16 | End: 2024-01-16 | Stop reason: HOSPADM

## 2024-01-16 RX ORDER — SIMETHICONE 125 MG
125 TABLET,CHEWABLE ORAL 4 TIMES DAILY PRN
Status: DISCONTINUED | OUTPATIENT
Start: 2024-01-16 | End: 2024-01-17 | Stop reason: HOSPADM

## 2024-01-16 RX ORDER — METHYLERGONOVINE MALEATE 0.2 MG/ML
0.2 INJECTION INTRAVENOUS
Status: DISCONTINUED | OUTPATIENT
Start: 2024-01-16 | End: 2024-01-17 | Stop reason: HOSPADM

## 2024-01-16 RX ORDER — ACETAMINOPHEN 500 MG
1000 TABLET ORAL
Status: COMPLETED | OUTPATIENT
Start: 2024-01-16 | End: 2024-01-16

## 2024-01-16 RX ORDER — CARBOPROST TROMETHAMINE 250 UG/ML
250 INJECTION, SOLUTION INTRAMUSCULAR
Status: DISCONTINUED | OUTPATIENT
Start: 2024-01-16 | End: 2024-01-17 | Stop reason: HOSPADM

## 2024-01-16 RX ORDER — DEXTROSE, SODIUM CHLORIDE, SODIUM LACTATE, POTASSIUM CHLORIDE, AND CALCIUM CHLORIDE 5; .6; .31; .03; .02 G/100ML; G/100ML; G/100ML; G/100ML; G/100ML
INJECTION, SOLUTION INTRAVENOUS CONTINUOUS
Status: DISCONTINUED | OUTPATIENT
Start: 2024-01-16 | End: 2024-01-16

## 2024-01-16 RX ORDER — OXYTOCIN 10 [USP'U]/ML
10 INJECTION, SOLUTION INTRAMUSCULAR; INTRAVENOUS
Status: DISCONTINUED | OUTPATIENT
Start: 2024-01-16 | End: 2024-01-16 | Stop reason: HOSPADM

## 2024-01-16 RX ORDER — VITAMIN A ACETATE, BETA CAROTENE, ASCORBIC ACID, CHOLECALCIFEROL, .ALPHA.-TOCOPHEROL ACETATE, DL-, THIAMINE MONONITRATE, RIBOFLAVIN, NIACINAMIDE, PYRIDOXINE HYDROCHLORIDE, FOLIC ACID, CYANOCOBALAMIN, CALCIUM CARBONATE, FERROUS FUMARATE, ZINC OXIDE, CUPRIC OXIDE 3080; 12; 120; 400; 1; 1.84; 3; 20; 22; 920; 25; 200; 27; 10; 2 [IU]/1; UG/1; MG/1; [IU]/1; MG/1; MG/1; MG/1; MG/1; MG/1; [IU]/1; MG/1; MG/1; MG/1; MG/1; MG/1
1 TABLET, FILM COATED ORAL
Status: DISCONTINUED | OUTPATIENT
Start: 2024-01-17 | End: 2024-01-17 | Stop reason: HOSPADM

## 2024-01-16 RX ORDER — LIDOCAINE HYDROCHLORIDE 10 MG/ML
20 INJECTION, SOLUTION INFILTRATION; PERINEURAL
Status: DISCONTINUED | OUTPATIENT
Start: 2024-01-16 | End: 2024-01-16 | Stop reason: HOSPADM

## 2024-01-16 RX ORDER — IBUPROFEN 800 MG/1
800 TABLET ORAL EVERY 8 HOURS PRN
Status: DISCONTINUED | OUTPATIENT
Start: 2024-01-17 | End: 2024-01-17 | Stop reason: HOSPADM

## 2024-01-16 RX ORDER — ROPIVACAINE HYDROCHLORIDE 2 MG/ML
INJECTION, SOLUTION EPIDURAL; INFILTRATION; PERINEURAL CONTINUOUS
Status: DISCONTINUED | OUTPATIENT
Start: 2024-01-16 | End: 2024-01-16

## 2024-01-16 RX ORDER — ONDANSETRON 4 MG/1
4 TABLET, ORALLY DISINTEGRATING ORAL EVERY 6 HOURS PRN
Status: DISCONTINUED | OUTPATIENT
Start: 2024-01-16 | End: 2024-01-16 | Stop reason: HOSPADM

## 2024-01-16 RX ORDER — MISOPROSTOL 200 UG/1
600 TABLET ORAL
Status: DISCONTINUED | OUTPATIENT
Start: 2024-01-16 | End: 2024-01-17 | Stop reason: HOSPADM

## 2024-01-16 RX ORDER — BUPIVACAINE HYDROCHLORIDE 2.5 MG/ML
INJECTION, SOLUTION EPIDURAL; INFILTRATION; INTRACAUDAL
Status: ACTIVE
Start: 2024-01-16 | End: 2024-01-16

## 2024-01-16 RX ORDER — SODIUM CHLORIDE, SODIUM LACTATE, POTASSIUM CHLORIDE, CALCIUM CHLORIDE 600; 310; 30; 20 MG/100ML; MG/100ML; MG/100ML; MG/100ML
INJECTION, SOLUTION INTRAVENOUS PRN
Status: DISCONTINUED | OUTPATIENT
Start: 2024-01-16 | End: 2024-01-17 | Stop reason: HOSPADM

## 2024-01-16 RX ADMIN — ONDANSETRON 4 MG: 2 INJECTION INTRAMUSCULAR; INTRAVENOUS at 17:12

## 2024-01-16 RX ADMIN — OXYTOCIN 2 MILLI-UNITS/MIN: 10 INJECTION, SOLUTION INTRAMUSCULAR; INTRAVENOUS at 07:34

## 2024-01-16 RX ADMIN — IBUPROFEN 800 MG: 800 TABLET, FILM COATED ORAL at 19:11

## 2024-01-16 RX ADMIN — ACETAMINOPHEN 1000 MG: 500 TABLET ORAL at 19:10

## 2024-01-16 RX ADMIN — OXYTOCIN 20 UNITS: 10 INJECTION, SOLUTION INTRAMUSCULAR; INTRAVENOUS at 17:56

## 2024-01-16 RX ADMIN — ONDANSETRON 4 MG: 2 INJECTION INTRAMUSCULAR; INTRAVENOUS at 17:11

## 2024-01-16 RX ADMIN — SODIUM CHLORIDE, POTASSIUM CHLORIDE, SODIUM LACTATE AND CALCIUM CHLORIDE 1000 ML: 600; 310; 30; 20 INJECTION, SOLUTION INTRAVENOUS at 15:03

## 2024-01-16 RX ADMIN — SODIUM CHLORIDE, POTASSIUM CHLORIDE, SODIUM LACTATE AND CALCIUM CHLORIDE 1000 ML: 600; 310; 30; 20 INJECTION, SOLUTION INTRAVENOUS at 07:27

## 2024-01-16 RX ADMIN — SODIUM CHLORIDE, POTASSIUM CHLORIDE, SODIUM LACTATE AND CALCIUM CHLORIDE 1000 ML: 600; 310; 30; 20 INJECTION, SOLUTION INTRAVENOUS at 09:42

## 2024-01-16 RX ADMIN — LIDOCAINE HYDROCHLORIDE,EPINEPHRINE BITARTRATE 3 ML: 15; .005 INJECTION, SOLUTION EPIDURAL; INFILTRATION; INTRACAUDAL; PERINEURAL at 10:02

## 2024-01-16 RX ADMIN — OXYTOCIN 125 ML/HR: 10 INJECTION, SOLUTION INTRAMUSCULAR; INTRAVENOUS at 19:45

## 2024-01-16 ASSESSMENT — PAIN DESCRIPTION - PAIN TYPE
TYPE: ACUTE PAIN

## 2024-01-16 ASSESSMENT — PATIENT HEALTH QUESTIONNAIRE - PHQ9
1. LITTLE INTEREST OR PLEASURE IN DOING THINGS: NOT AT ALL
SUM OF ALL RESPONSES TO PHQ9 QUESTIONS 1 AND 2: 0
2. FEELING DOWN, DEPRESSED, IRRITABLE, OR HOPELESS: NOT AT ALL

## 2024-01-16 ASSESSMENT — PAIN SCALES - GENERAL: PAIN_LEVEL: 3

## 2024-01-16 ASSESSMENT — LIFESTYLE VARIABLES
ALCOHOL_USE: NO
TOTAL SCORE: 0
DOES PATIENT WANT TO TALK TO SOMEONE ABOUT QUITTING: NO
CONSUMPTION TOTAL: INCOMPLETE
TOTAL SCORE: 0
HAVE YOU EVER FELT YOU SHOULD CUT DOWN ON YOUR DRINKING: NO
EVER FELT BAD OR GUILTY ABOUT YOUR DRINKING: NO
EVER_SMOKED: NEVER
EVER HAD A DRINK FIRST THING IN THE MORNING TO STEADY YOUR NERVES TO GET RID OF A HANGOVER: NO
TOTAL SCORE: 0
HAVE PEOPLE ANNOYED YOU BY CRITICIZING YOUR DRINKING: NO

## 2024-01-16 ASSESSMENT — FIBROSIS 4 INDEX: FIB4 SCORE: 0.66

## 2024-01-16 NOTE — ANESTHESIA PREPROCEDURE EVALUATION
Date: 01/16/24  Procedure: Labor Epidural         Relevant Problems   No relevant active problems       Physical Exam    Airway   Mallampati: II  TM distance: >3 FB  Neck ROM: full       Cardiovascular - normal exam  Rhythm: regular  Rate: normal  (-) murmur     Dental - normal exam           Pulmonary - normal exam  Breath sounds clear to auscultation     Abdominal    Neurological - normal exam                   Anesthesia Plan    ASA 2       Plan - epidural   Neuraxial block will be labor analgesia                  Pertinent diagnostic labs and testing reviewed    Informed Consent:    Anesthetic plan and risks discussed with patient.

## 2024-01-16 NOTE — ANESTHESIA PROCEDURE NOTES
Epidural Block    Date/Time: 1/16/2024 10:02 AM    Performed by: Noel Lau D.O.  Authorized by: Noel Lau D.O.    Patient Location:  OB  Start Time:  1/16/2024 10:02 AM  End Time:  1/16/2024 10:09 AM  Reason for Block: labor analgesia    patient identified, IV checked, site marked, risks and benefits discussed, surgical consent, monitors and equipment checked and pre-op evaluation    Patient Position:  Sitting  Prep: ChloraPrep, patient draped and sterile technique    Monitoring:  Blood pressure, continuous pulse oximetry and heart rate  Approach:  Midline  Location:  L4-L5  Injection Technique:  MANDO air and MANDO saline  Skin infiltration:  Lidocaine  Strength:  1%  Dose:  3ml  Needle Type:  Tuohy  Needle Gauge:  17 G  Needle Length:  3.5 in  Loss of resistance::  6  Catheter Size:  19 G  Catheter at Skin Depth:  13  Test Dose Result:  Negative

## 2024-01-16 NOTE — PROGRESS NOTES
G 4 P1 39 weeks pt is here for elective induction. Pt was admitted, IV started. SVE done ,Dr fung was notified, orders received.  0734 Pit started.  0930 pt would like epidural. Spoke to Dr Lau and hydration started, consent was signed. Epidural process was explained.  0953 Dr Lau in. Time out done, epidural cath was placed.  1014 test dose done, tolerated well.  1026 pt is comfortable.  1137 DR Fung in SVE and AROM.  1420 DR Fung in IUPC was inserted.  1740 SVE done complete +2  1753 female viable baby delivered by DR Fung.  with 8-9 apgars.  1900 report given to night shift RN

## 2024-01-16 NOTE — H&P
History and Physical      Nhi Napier is a 38 y.o. female  at 39w0d who presents for IOL, AMA    Subjective:   positive  For CTXS.   negative Feels pain   negative for LOF  negative for vaginal bleeding.   positive for fetal movement    ROS: Pertinent items are noted in HPI.    Past Medical History:   Diagnosis Date    Migraine      Past Surgical History:   Procedure Laterality Date    DILATION AND EVACUATION N/A 10/2/2022    Procedure: DILATION AND EVACUATION, UTERUS;  Surgeon: Tamiko Metcalf M.D.;  Location: SURGERY [facility];  Service: Obstetrics     Family History   Problem Relation Age of Onset    No Known Problems Mother     Stroke Father      OB History    Para Term  AB Living   4 1 1 0 1 0   SAB IAB Ectopic Molar Multiple Live Births   1 0 0 0 0 0      # Outcome Date GA Lbr Julien/2nd Weight Sex Delivery Anes PTL Lv   4 Current            3 SAB            2 Term            1               Social History     Tobacco Use    Smoking status: Never    Smokeless tobacco: Never   Vaping Use    Vaping Use: Never used   Substance Use Topics    Alcohol use: Not Currently     Comment: occ    Drug use: No     Allergies: Patient has no known allergies.    Current Facility-Administered Medications:     lidocaine (XYLOCAINE) 1%  injection, 20 mL, Subcutaneous, Once PRN, Nasrin Fung M.D.    terbutaline (Brethine) injection 0.25 mg, 0.25 mg, Subcutaneous, Once PRN, Nasrin Fung M.D.    oxytocin (Pitocin) infusion bolus (for post delivery), 20 Units, Intravenous, Once **FOLLOWED BY** oxytocin (Pitocin) infusion (for post delivery), 125 mL/hr, Intravenous, Continuous, Nasrin Fung M.D.    oxytocin (Pitocin) injection 10 Units, 10 Units, Intramuscular, Once PRN, Nasrin Fung M.D.    ibuprofen (Motrin) tablet 800 mg, 800 mg, Oral, Once PRN, Nasrin Fung M.D.    acetaminophen (Tylenol) tablet 1,000 mg, 1,000 mg, Oral, Once PRN, Nasrin L.  "TAMIR Fung    fentaNYL (Sublimaze) injection 50 mcg, 50 mcg, Intravenous, Q HOUR PRN **OR** fentaNYL (Sublimaze) injection 100 mcg, 100 mcg, Intravenous, Q HOUR PRN, Nasrin Fung M.D.    lactated ringers infusion, 1,000 mL, Intravenous, Continuous, Last Rate: 125 mL/hr at 01/16/24 0727, 1,000 mL at 01/16/24 0727 **FOLLOWED BY** D5LR infusion, , Intravenous, Continuous, Nasrin Fung M.D.    oxytocin (Pitocin) infusion (for induction), 0.5-20 asher-units/min, Intravenous, Continuous, aNsrin Fung M.D., Last Rate: 12 mL/hr at 01/16/24 0805, 4 asher-units/min at 01/16/24 0805    ondansetron (Zofran ODT) dispertab 4 mg, 4 mg, Oral, Q6HRS PRN **OR** ondansetron (Zofran) syringe/vial injection 4 mg, 4 mg, Intravenous, Q6HRS PRN, Nasrin Fung M.D.    miSOPROStol (Cytotec) tablet 800 mcg, 800 mcg, Rectal, Once PRN, Nasrin Fung M.D.    methylergonovine (Methergine) injection 0.2 mg, 0.2 mg, Intramuscular, Once PRN, Nasrin Fung M.D.    Prenatal care with [facility] starting at 10 with following problems:  Patient Active Problem List    Diagnosis Date Noted    Retained products of conception after miscarriage 10/02/2022    Acute blood loss as cause of postoperative anemia 03/25/2020       Objective:      /73   Pulse 78   Temp 36.8 °C (98.2 °F)   Resp 18   Ht 1.753 m (5' 9\")   Wt 99.8 kg (220 lb)   SpO2 94%     General:   no acute distress   Skin:   normal   HEENT:  Neck supple with midline trachea   Lungs:   CTA bilateral   Heart:   S1, S2 normal, no murmur, click, rub or gallop, regular rate and rhythm, no hepatosplenomegaly, S1, S2 normal,\"no JVD   Abdomen:   gravid, NT   EFW:  7.5 lbs   Pelvis:  adequate with gynecoid pelvis   FHT:  150 BPM   Uterine Size: S=D   Presentations: Cephalic   Cervix:     Dilation: 2cm    Effacement: 50%    Station:  -2    Consistency: Medium    Position: Posterior     Lab Review  Recent Results (from the past 5880 hour(s))   Hold Blood Bank " Specimen (Not Tested)    Collection Time: 01/16/24  6:50 AM   Result Value Ref Range    Holding Tube - Bb DONE    CBC with differential    Collection Time: 01/16/24  6:50 AM   Result Value Ref Range    WBC 8.8 4.8 - 10.8 K/uL    RBC 3.71 (L) 4.20 - 5.40 M/uL    Hemoglobin 11.6 (L) 12.0 - 16.0 g/dL    Hematocrit 33.8 (L) 37.0 - 47.0 %    MCV 91.1 81.4 - 97.8 fL    MCH 31.3 27.0 - 33.0 pg    MCHC 34.3 32.2 - 35.5 g/dL    RDW 46.7 35.9 - 50.0 fL    Platelet Count 149 (L) 164 - 446 K/uL    MPV 11.3 9.0 - 12.9 fL    Neutrophils-Polys 74.60 (H) 44.00 - 72.00 %    Lymphocytes 16.80 (L) 22.00 - 41.00 %    Monocytes 6.50 0.00 - 13.40 %    Eosinophils 1.10 0.00 - 6.90 %    Basophils 0.20 0.00 - 1.80 %    Immature Granulocytes 0.80 0.00 - 0.90 %    Nucleated RBC 0.00 0.00 - 0.20 /100 WBC    Neutrophils (Absolute) 6.59 1.82 - 7.42 K/uL    Lymphs (Absolute) 1.48 1.00 - 4.80 K/uL    Monos (Absolute) 0.57 0.00 - 0.85 K/uL    Eos (Absolute) 0.10 0.00 - 0.51 K/uL    Baso (Absolute) 0.02 0.00 - 0.12 K/uL    Immature Granulocytes (abs) 0.07 0.00 - 0.11 K/uL    NRBC (Absolute) 0.00 K/uL   T.PALLIDUM AB ROCIO (Syphilis)    Collection Time: 01/16/24  6:50 AM   Result Value Ref Range    Syphilis, Treponemal Qual Non-Reactive Non-Reactive        Assessment:   Nhi Napier at 39w0d  Labor status: Not in labor.  Obstetrical history significant for   Patient Active Problem List    Diagnosis Date Noted    Retained products of conception after miscarriage 10/02/2022    Acute blood loss as cause of postoperative anemia 03/25/2020   .      Plan:     Admit to L&D  GBS negative

## 2024-01-16 NOTE — PROGRESS NOTES
Labor Progress Note    Nhi Rachele Napier   39w0d      Subjective:  Uterine contractions:yes  Pain: No    Objective:   Vitals:    01/16/24 1000 01/16/24 1005 01/16/24 1010 01/16/24 1015   BP: 133/85  128/86    Pulse: 89 94 88 88   Resp:       Temp:    36.5 °C (97.7 °F)   TempSrc:    Temporal   SpO2: 99% 98% 96%    Weight:       Height:         Fetal heart variability: moderate  Fetal Heart Rate decelerations: none  Fetal Heart Rate accelerations: yes  Baseline FHR: 130 per minute  Uterine contractions: regular, every 3 minutes  Cervical: 50% ;  Dilatation .2 ; Station negative2    Membranes ruptured: .yes    Meds:   Epidural : .yes  Pitocin: .yes    Labs:  Recent Results (from the past 24 hour(s))   Hold Blood Bank Specimen (Not Tested)    Collection Time: 01/16/24  6:50 AM   Result Value Ref Range    Holding Tube - Bb DONE    CBC with differential    Collection Time: 01/16/24  6:50 AM   Result Value Ref Range    WBC 8.8 4.8 - 10.8 K/uL    RBC 3.71 (L) 4.20 - 5.40 M/uL    Hemoglobin 11.6 (L) 12.0 - 16.0 g/dL    Hematocrit 33.8 (L) 37.0 - 47.0 %    MCV 91.1 81.4 - 97.8 fL    MCH 31.3 27.0 - 33.0 pg    MCHC 34.3 32.2 - 35.5 g/dL    RDW 46.7 35.9 - 50.0 fL    Platelet Count 149 (L) 164 - 446 K/uL    MPV 11.3 9.0 - 12.9 fL    Neutrophils-Polys 74.60 (H) 44.00 - 72.00 %    Lymphocytes 16.80 (L) 22.00 - 41.00 %    Monocytes 6.50 0.00 - 13.40 %    Eosinophils 1.10 0.00 - 6.90 %    Basophils 0.20 0.00 - 1.80 %    Immature Granulocytes 0.80 0.00 - 0.90 %    Nucleated RBC 0.00 0.00 - 0.20 /100 WBC    Neutrophils (Absolute) 6.59 1.82 - 7.42 K/uL    Lymphs (Absolute) 1.48 1.00 - 4.80 K/uL    Monos (Absolute) 0.57 0.00 - 0.85 K/uL    Eos (Absolute) 0.10 0.00 - 0.51 K/uL    Baso (Absolute) 0.02 0.00 - 0.12 K/uL    Immature Granulocytes (abs) 0.07 0.00 - 0.11 K/uL    NRBC (Absolute) 0.00 K/uL   T.PALLIDUM AB ROCIO (Syphilis)    Collection Time: 01/16/24  6:50 AM   Result Value Ref Range    Syphilis, Treponemal  Qual Non-Reactive Non-Reactive       Assessment:   39w0d  Labor State: Early latent labor.      Nasrin Fung M.D.

## 2024-01-16 NOTE — CARE PLAN
The patient is Stable - Low risk of patient condition declining or worsening         Progress made toward(s) clinical / shift goals:    Problem: Knowledge Deficit - L&D  Goal: Patient and family/caregivers will demonstrate understanding of plan of care, disease process/condition, diagnostic tests and medications  Outcome: Progressing  Note: POC was discussed with pt.     Problem: Pain  Goal: Patient's pain will be alleviated or reduced to the patient’s comfort goal  Flowsheets (Taken 1/16/2024 4970)  Pain Rating Scale (NPRS): 0  OB Pain Level: 0-No Pain  OB Pain Intervention: Education  Note: Epidural and timing of it was discussed.       Patient is not progressing towards the following goals:

## 2024-01-16 NOTE — PROGRESS NOTES
Labor Progress Note    Nhi Rachele Napier   39w0d      Subjective:  Uterine contractions:yes  Pain: No    Objective:   Vitals:    01/16/24 1310 01/16/24 1325 01/16/24 1345 01/16/24 1352   BP: 101/61 116/64 112/73    Pulse: 83 80 79    Resp:    16   Temp:    36.4 °C (97.6 °F)   TempSrc:    Temporal   SpO2:       Weight:       Height:         Fetal heart variability: moderate  Fetal Heart Rate decelerations: none  Fetal Heart Rate accelerations: yes  Baseline FHR: 130 per minute  Uterine contractions: regular, every 3 minutes  Cervical: 75% ;  Dilatation .4 ; Station negative2    Membranes ruptured: .yes    Meds:   Epidural : .yes  Pitocin: .yes    Labs:  Recent Results (from the past 24 hour(s))   Hold Blood Bank Specimen (Not Tested)    Collection Time: 01/16/24  6:50 AM   Result Value Ref Range    Holding Tube - Bb DONE    CBC with differential    Collection Time: 01/16/24  6:50 AM   Result Value Ref Range    WBC 8.8 4.8 - 10.8 K/uL    RBC 3.71 (L) 4.20 - 5.40 M/uL    Hemoglobin 11.6 (L) 12.0 - 16.0 g/dL    Hematocrit 33.8 (L) 37.0 - 47.0 %    MCV 91.1 81.4 - 97.8 fL    MCH 31.3 27.0 - 33.0 pg    MCHC 34.3 32.2 - 35.5 g/dL    RDW 46.7 35.9 - 50.0 fL    Platelet Count 149 (L) 164 - 446 K/uL    MPV 11.3 9.0 - 12.9 fL    Neutrophils-Polys 74.60 (H) 44.00 - 72.00 %    Lymphocytes 16.80 (L) 22.00 - 41.00 %    Monocytes 6.50 0.00 - 13.40 %    Eosinophils 1.10 0.00 - 6.90 %    Basophils 0.20 0.00 - 1.80 %    Immature Granulocytes 0.80 0.00 - 0.90 %    Nucleated RBC 0.00 0.00 - 0.20 /100 WBC    Neutrophils (Absolute) 6.59 1.82 - 7.42 K/uL    Lymphs (Absolute) 1.48 1.00 - 4.80 K/uL    Monos (Absolute) 0.57 0.00 - 0.85 K/uL    Eos (Absolute) 0.10 0.00 - 0.51 K/uL    Baso (Absolute) 0.02 0.00 - 0.12 K/uL    Immature Granulocytes (abs) 0.07 0.00 - 0.11 K/uL    NRBC (Absolute) 0.00 K/uL   T.PALLIDUM AB ROCIO (Syphilis)    Collection Time: 01/16/24  6:50 AM   Result Value Ref Range    Syphilis, Treponemal Qual  Non-Reactive Non-Reactive       Assessment:   39w0d  Labor State: Early latent labor.      Nasrin Fung M.D.

## 2024-01-17 VITALS
DIASTOLIC BLOOD PRESSURE: 86 MMHG | SYSTOLIC BLOOD PRESSURE: 123 MMHG | HEIGHT: 69 IN | TEMPERATURE: 97.4 F | OXYGEN SATURATION: 97 % | HEART RATE: 84 BPM | RESPIRATION RATE: 18 BRPM | WEIGHT: 220 LBS | BODY MASS INDEX: 32.58 KG/M2

## 2024-01-17 LAB
ERYTHROCYTE [DISTWIDTH] IN BLOOD BY AUTOMATED COUNT: 47.3 FL (ref 35.9–50)
HCT VFR BLD AUTO: 34.8 % (ref 37–47)
HGB BLD-MCNC: 11.8 G/DL (ref 12–16)
MCH RBC QN AUTO: 31.2 PG (ref 27–33)
MCHC RBC AUTO-ENTMCNC: 33.9 G/DL (ref 32.2–35.5)
MCV RBC AUTO: 92.1 FL (ref 81.4–97.8)
PLATELET # BLD AUTO: 151 K/UL (ref 164–446)
PMV BLD AUTO: 11.3 FL (ref 9–12.9)
RBC # BLD AUTO: 3.78 M/UL (ref 4.2–5.4)
WBC # BLD AUTO: 9.6 K/UL (ref 4.8–10.8)

## 2024-01-17 PROCEDURE — 36415 COLL VENOUS BLD VENIPUNCTURE: CPT

## 2024-01-17 PROCEDURE — 700102 HCHG RX REV CODE 250 W/ 637 OVERRIDE(OP): Performed by: OBSTETRICS & GYNECOLOGY

## 2024-01-17 PROCEDURE — A9270 NON-COVERED ITEM OR SERVICE: HCPCS | Performed by: OBSTETRICS & GYNECOLOGY

## 2024-01-17 PROCEDURE — 85027 COMPLETE CBC AUTOMATED: CPT

## 2024-01-17 RX ADMIN — ACETAMINOPHEN 1000 MG: 500 TABLET ORAL at 07:27

## 2024-01-17 RX ADMIN — PRENATAL WITH FERROUS FUM AND FOLIC ACID 1 TABLET: 3080; 920; 120; 400; 22; 1.84; 3; 20; 10; 1; 12; 200; 27; 25; 2 TABLET ORAL at 07:27

## 2024-01-17 ASSESSMENT — EDINBURGH POSTNATAL DEPRESSION SCALE (EPDS)
I HAVE FELT SCARED OR PANICKY FOR NO GOOD REASON: NO, NOT AT ALL
THE THOUGHT OF HARMING MYSELF HAS OCCURRED TO ME: NEVER
I HAVE FELT SAD OR MISERABLE: NO, NOT AT ALL
I HAVE BEEN SO UNHAPPY THAT I HAVE HAD DIFFICULTY SLEEPING: NOT VERY OFTEN
I HAVE BEEN SO UNHAPPY THAT I HAVE BEEN CRYING: NO, NEVER
I HAVE BEEN ANXIOUS OR WORRIED FOR NO GOOD REASON: HARDLY EVER
I HAVE BEEN ABLE TO LAUGH AND SEE THE FUNNY SIDE OF THINGS: AS MUCH AS I ALWAYS COULD
I HAVE BLAMED MYSELF UNNECESSARILY WHEN THINGS WENT WRONG: YES, SOME OF THE TIME
THINGS HAVE BEEN GETTING ON TOP OF ME: NO, MOST OF THE TIME I HAVE COPED QUITE WELL
I HAVE LOOKED FORWARD WITH ENJOYMENT TO THINGS: AS MUCH AS I EVER DID

## 2024-01-17 ASSESSMENT — PAIN DESCRIPTION - PAIN TYPE: TYPE: ACUTE PAIN

## 2024-01-17 NOTE — L&D DELIVERY NOTE
Procedure: [facility]      Nhi Napier is a 38 y.o.  at 22s7ehce is an established patient of .  Patient was admitted for induction of labor due to AMA.  Patient received Pitocin for augmentation of labor process.  She progressed to complete/complete and a +2 station.  Patient then pushed with the fetal head delivering over intact Yes,  perineum.  Fetal head delivered in an OA position.  Nuchal cordwas not present.  Anterior and posterior shoulders then delivered without complications with the rest of the body to follow.    The infant female was placed on the maternal abdomen and attended by awaiting nursing staff.  The Apgars were 8 at 1 minute and 9 at 5 minutes.  After approximately 1 to 3 minutes the cord was then clamped x2 and cut.  The placenta delivered spontaneously, Owens ,intact with a three-vessel cord.  There was a 1 st degree labial laceration.   Estimated blood loss 300 cc  Sponge count correct  Mother to recover in labor and delivery, infant to be transferred to well-baby nursery.

## 2024-01-17 NOTE — ANESTHESIA POSTPROCEDURE EVALUATION
Patient: Nhi Napier    Procedure Summary       Date: 01/16/24 Room / Location:     Anesthesia Start: 0951 Anesthesia Stop: 1753    Procedure: Labor Epidural Diagnosis:     Scheduled Providers:  Responsible Provider: Noel Lau D.O.    Anesthesia Type: epidural ASA Status: 2            Final Anesthesia Type: epidural  Last vitals  BP   Blood Pressure: 124/74    Temp   36.7 °C (98 °F)    Pulse   89   Resp   18    SpO2   95 %      Anesthesia Post Evaluation    Patient location during evaluation: PACU  Patient participation: complete - patient participated  Level of consciousness: awake and alert  Pain score: 3    Airway patency: patent  Anesthetic complications: no  Cardiovascular status: hemodynamically stable  Respiratory status: acceptable  Hydration status: euvolemic    PONV: none          No notable events documented.     Nurse Pain Score: 0 (NPRS)

## 2024-01-17 NOTE — ANESTHESIA TIME REPORT
Anesthesia Start and Stop Event Times       Date Time Event    1/16/2024 0938 Ready for Procedure     0951 Anesthesia Start     1753 Anesthesia Stop          Responsible Staff  01/16/24      Name Role Begin End    Noel Lau D.O. Anesth 0951 1753          Overtime Reason:  no overtime (within assigned shift)    Comments:

## 2024-01-17 NOTE — PROGRESS NOTES
Pt. Arrivedby wheelchair to postpartum  with belongings to room 314, received report from Lawanda SEVERINO. Pt. Doing well. Funds firm, light lochia/rubra. Pt oriented to room, emergency call light and infant security. Pt. Aware of dangers of sleeping with infant. Call light within reach.

## 2024-01-17 NOTE — CARE PLAN
The patient is Stable - Low risk of patient condition declining or worsening    Shift Goals  Clinical Goals: pain management, ambulate, VS WDL, fundus firm with light/scant lochia  Patient Goals:   Family Goals: support    Progress made toward(s) clinical / shift goals:  fundus is firm with light lochia, stable VS, pain well managed with PRN pain medications.     Patient is not progressing towards the following goals: N/A

## 2024-01-17 NOTE — PROGRESS NOTES
Bedside report received from Lori SÁNCHEZ RN. Patient care assumed. Chart, prenatal labs, and orders reviewed  Patient assessment complete and WDL. Fundus firm and lochia scant. Patient ambulating to bathroom and voiding without difficulty. Patient denies any dizziness/lightheadedness or calf pain/tenderness. Patient also denies any chest pain, difficulty breathing or SOB. Plan of care discussed with patient for the day including infant feeding every 2-3 hours or on demand, pain management, and ambulation in halls. Pain medication plan discussed with patient; patient states she will call if PRN pain medication is wanted. All questions/concerns addressed at this time. Call light within reach, encouraged to call with needs. Will continue with routine postpartum cares.

## 2024-01-17 NOTE — PROGRESS NOTES
1900: Report from Randi SEVERINO. POC discussed, assumed care of pt.  2030: Pt taken up to the bathroom with a firm fundus and light lochia. Pt walking with a steady gate and able to successfully void. Natalie care done.  2045: Pt taken up to postpartum in stable condition. Infant bands checked x2. Report to Zita SEVERINO.

## 2024-01-17 NOTE — PROGRESS NOTES
Labor Progress Note    Nhi Rachele Napier   39w0d      Subjective:  Uterine contractions:yes  Pain: Yes. Severity: moderate    Objective:   Vitals:    01/16/24 1545 01/16/24 1610 01/16/24 1625 01/16/24 1641   BP: 134/79 (!) 140/82 139/85    Pulse: 78 85 80    Resp:    18   Temp: 36.6 °C (97.8 °F)   36.9 °C (98.4 °F)   TempSrc: Temporal   Temporal   SpO2:       Weight:       Height:         Fetal heart variability: moderate  Fetal Heart Rate decelerations: early  Fetal Heart Rate accelerations: yes  Baseline FHR: 150 per minute  Uterine contractions: regular, every 6 minutes  Cervical: 100% ;  Dilatation .AL ; Station negative0    Membranes ruptured: .yes    Meds:   Epidural : .yes  Pitocin: .yes    Labs:  Recent Results (from the past 24 hour(s))   Hold Blood Bank Specimen (Not Tested)    Collection Time: 01/16/24  6:50 AM   Result Value Ref Range    Holding Tube - Bb DONE    CBC with differential    Collection Time: 01/16/24  6:50 AM   Result Value Ref Range    WBC 8.8 4.8 - 10.8 K/uL    RBC 3.71 (L) 4.20 - 5.40 M/uL    Hemoglobin 11.6 (L) 12.0 - 16.0 g/dL    Hematocrit 33.8 (L) 37.0 - 47.0 %    MCV 91.1 81.4 - 97.8 fL    MCH 31.3 27.0 - 33.0 pg    MCHC 34.3 32.2 - 35.5 g/dL    RDW 46.7 35.9 - 50.0 fL    Platelet Count 149 (L) 164 - 446 K/uL    MPV 11.3 9.0 - 12.9 fL    Neutrophils-Polys 74.60 (H) 44.00 - 72.00 %    Lymphocytes 16.80 (L) 22.00 - 41.00 %    Monocytes 6.50 0.00 - 13.40 %    Eosinophils 1.10 0.00 - 6.90 %    Basophils 0.20 0.00 - 1.80 %    Immature Granulocytes 0.80 0.00 - 0.90 %    Nucleated RBC 0.00 0.00 - 0.20 /100 WBC    Neutrophils (Absolute) 6.59 1.82 - 7.42 K/uL    Lymphs (Absolute) 1.48 1.00 - 4.80 K/uL    Monos (Absolute) 0.57 0.00 - 0.85 K/uL    Eos (Absolute) 0.10 0.00 - 0.51 K/uL    Baso (Absolute) 0.02 0.00 - 0.12 K/uL    Immature Granulocytes (abs) 0.07 0.00 - 0.11 K/uL    NRBC (Absolute) 0.00 K/uL   T.PALLIDUM AB ROCIO (Syphilis)    Collection Time: 01/16/24  6:50 AM    Result Value Ref Range    Syphilis, Treponemal Qual Non-Reactive Non-Reactive       Assessment:   39w0d  Labor State: Active phase labor.      Nasrin Fung M.D.

## 2024-01-17 NOTE — DISCHARGE INSTRUCTIONS
PATIENT DISCHARGE EDUCATION INSTRUCTION SHEET    REASONS TO CALL YOUR OBSTETRICIAN  Persistent fever, shaking, chills (Temperature higher than 100.4) may indicate you have an infection  Heavy bleeding: soaking more than 1 pad per hour; Passing clots an egg-sized clot or bigger may mean you have an postpartum hemorrhage  Foul odor from vagina or bad smelling or discolored discharge or blood  Breast infection (Mastitis symptoms); breast pain, chills, fever, redness or red streaks, may feel flu like symptoms  Urinary pain, burning or frequency  Incision that is not healing, increased redness, swelling, tenderness or pain, or any pus from episiotomy or  site may mean you have an infection  Redness, swelling, warmth, or painful to touch in the calf area of your leg may mean you have a blood clot  Severe or intensified depression, thoughts or feelings of wanting to hurt yourself or someone else   Pain in chest, obstructed breathing or shortness of breath (trouble catching your breath) may mean you are having a postpartum complication. Call your provider immediately   Headache that does not get better, even after taking medicine, a bad headache with vision changes or pain in the upper right area of your belly may mean you have high blood pressure or post birth preeclampsia. Call your provider immediately    HAND WASHING  All family and friends should wash their hands:  Before and after holding the baby  Before feeding the baby  After using the restroom or changing the baby's diaper    WOUND CARE  Ask your physician for additional care instructions. In general:  Episiotomy/Laceration  May use jonathan-spray bottle, witch hazel pads and dermaplast spray for comfort  Use jonathan-spray bottle after urinating to cleanse perineal area  To prevent burning during urination spray jonathan-water bottle on labial area   Pat perineal area dry until episiotomy/laceration is healed  Continue to use jonathan-bottle until bleeding stops as  needed  If have a 2nd degree laceration or greater, a Sitz bath can offer relief from soreness, burning, and inflammation   Sitz Bath   Sit in 6 inches of warm water and soak laceration as needed until the laceration heals    VAGINAL CARE AND BLEEDING  Nothing inside vagina for 6 weeks:   No sexual intercourse, tampons or douching  Bleeding may continue for 2-4 weeks. Amount and color may vary  Soaking 1 pad or more in an hour for several hours is considered heavy bleeding  Passing large egg sized blood clots can be concerning  If you feel like you have heavy bleeding or are having increasing amount of blood clots call your Obstetrician immediately  If you begin feeling faint upon standing, feeling sick to your stomach, have clammy skin, a really fast heartbeat, have chills, start feeling confused, dizzy, sleepy or weak, or feeling like you're going to faint call your Obstetrician immediately    HYPERTENSION   Preeclampsia or gestational hypertension are types of high blood pressure that only pregnant women can get. It is important for you to be aware of symptoms to seek early intervention and treatment. If you have any of these symptoms immediately call your Obstetrician    Vision changes or blurred vision   Severe headache or pain that is unrelieved with medication and will not go away  Persistent pain in upper abdomen or shoulder   Increased swelling of face, feet, or hands  Difficulty breathing or shortness of breath at rest  Urinating less than usual    URINATION AND BOWEL MOVEMENTS  Eating more fiber (bran cereal, fruits, and vegetables) and drinking plenty of fluids will help to avoid constipation  Urinary frequency and urgency after childbirth is normal  If you experience any urinary pain, burning or frequency call your provider    BIRTH CONTROL  It is possible to become pregnant at any time after delivery and while breastfeeding  Plan to discuss a method of birth control with your physician at your post  "delivery follow up visit    POSTPARTUM BLUES  During the first few days after birth, you may experience a sense of the \"blues\" which may include impatience, irritability or even crying. These feelings come and go quickly. However, as many as 1 in 10 women experience emotional symptoms known as postpartum depression.     POSTPARTUM DEPRESSION    May start as early as the second or third day after delivery or take several weeks or months to develop. Symptoms of \"blues\" are present, but are more intense: Crying spells; loss of appetite; feelings of hopelessness or loss of control; fear of touching the baby; over concern or no concern at all about the baby; little or no concern about your own appearance/caring for yourself; and/or inability to sleep or excessive sleeping. Contact your Obstetrician if you are experiencing any of these symptoms     PREVENTING SHAKEN BABY  If you are angry or stressed, PUT THE BABY IN THE CRIB, step away, take some deep breaths, and wait until you are calm to care for the baby. DO NOT SHAKE THE BABY. You are not alone, call a supporter for help.  [facility] 24/7 crisis call line ([phone]) or (1-507.994.1976)  You can also text them, text \"ANSWER\" (602483)  "

## 2024-01-17 NOTE — DISCHARGE SUMMARY
Discharge Summary:      Yun Napier    Admit Date:   2024  Discharge Date:  2024     Admitting diagnosis:  Labor and delivery indication for care or intervention [O75.9]  Discharge Diagnosis: Status post vaginal, spontaneous.  Pregnancy Complications: AMA  Tubal Ligation:  no        History:  Past Medical History:   Diagnosis Date    Migraine      OB History    Para Term  AB Living   4 1 1 0 1 0   SAB IAB Ectopic Molar Multiple Live Births   1 0 0 0 0 0      # Outcome Date GA Lbr Julien/2nd Weight Sex Delivery Anes PTL Lv   4 Current            3 SAB            2 Term            1                  Patient has no known allergies.  Patient Active Problem List    Diagnosis Date Noted    Retained products of conception after miscarriage 10/02/2022    Acute blood loss as cause of postoperative anemia 2020        Hospital Course:   38 y.o. , now para 2, was admitted with the above mentioned diagnosis, underwent Active Labor, vaginal, spontaneous. Patient postpartum course was unremarkable, with progressive advancement in diet , ambulation and toleration of oral analgesia. Patient without complaints today and desires discharge.      Vitals:    24 1900 24 1930 24 2110 24 0200   BP: 129/63 110/73 137/76 109/64   Pulse: 86 88 100 89   Resp:   18 18   Temp: 36.3 °C (97.3 °F)  36.8 °C (98.2 °F) 36.3 °C (97.4 °F)   TempSrc: Temporal  Temporal Temporal   SpO2:   93% 98%   Weight:       Height:           Current Facility-Administered Medications   Medication Dose    lactated ringers infusion      docusate sodium (Colace) capsule 100 mg  100 mg    ibuprofen (Motrin) tablet 800 mg  800 mg    acetaminophen (Tylenol) tablet 1,000 mg  1,000 mg    measles, mumps and rubella vaccine (Mmr) injection 0.5 mL  0.5 mL    PRN oxytocin (PITOCIN) (20 Units/1000 mL) PRN for excessive uterine bleeding - See Admin Instr  125-999 mL/hr    miSOPROStol (Cytotec)  tablet 600 mcg  600 mcg    methylergonovine (Methergine) injection 0.2 mg  0.2 mg    carboPROST (Hemabate) injection 250 mcg  250 mcg    bisacodyl (Dulcolax) suppository 10 mg  10 mg    magnesium hydroxide (Milk Of Magnesia) suspension 30 mL  30 mL    prenatal plus vitamin (Stuartnatal 1+1) 27-1 MG tablet 1 Tablet  1 Tablet    simethicone (Mylicon) chewable tablet 125 mg  125 mg    calcium carbonate (Tums) chewable tab 1,000 mg  1,000 mg       Exam:  Breast Exam: negative  Abdomen: Abdomen soft, non-tender. BS normal. No masses,  No organomegaly  Fundus Non Tender: yes  Incision: none  Perineum: perineum intact  Extremity: extremities, peripheral pulses and reflexes normal     Labs:  Recent Labs     01/16/24  0650   WBC 8.8   RBC 3.71*   HEMOGLOBIN 11.6*   HEMATOCRIT 33.8*   MCV 91.1   MCH 31.3   MCHC 34.3   RDW 46.7   PLATELETCT 149*   MPV 11.3        Activity:   Discharge to home  Pelvic Rest x 6 weeks    Assessment:  normal postpartum course  Discharge Assessment: No areas of skin breakdown/redness; surgical incision intact/healing     Follow up: .[place] 6 weeks     Discharge Meds:   No current outpatient medications on file.       Nasrin Fung M.D.

## 2024-01-17 NOTE — LACTATION NOTE
This note was copied from a baby's chart.  Baby 39.1 weeks, , MOB Hx AMA, couplet to be discharged today. Mother reports she breast fed 1st baby x 6 months then pumped & provided x 6 more months. Mother states, this baby is latching and breastfeeding well, baby sleepy- latch not seen at this time. Breasts assessed- see assessment.    Feed baby with feeding cues and at least a minimum of 8x/24 hours.  Expect cluster feeding as this is normal during early days of life and growth spurts.  It is not recommended to let baby sleep longer than 4 hours between feedings and if sleepy, place skin to skin to promote feeding interest and milk production.  Baby's usually feed more frequently and longer when skin to skin with mother. It is not recommended to use pacifiers.    MOB has [place] insurance, NNB Resource sheet provided. Recommended  mother follow-[facility] with [facility].     Breastfeeding plan:  Breastfeed on cue a minimum 8 or more times in 24 hours no longer than 3 hours from last feed.

## 2024-01-18 NOTE — PROGRESS NOTES
ID bands verified by this RN. Discharge instructions reviewed with patient and signed by patient. Follow up appointments reviewed with patient; patient can take OTC Tylenol or Ibuprofen as needed for pain. All questions addressed at this time.    AVS scanned into [employer].

## 2024-01-18 NOTE — PROGRESS NOTES
Patient discharged off unit wheelchair with all personal belongings accompanied by CNA and SO with infant in car seat.

## 2024-05-17 ENCOUNTER — HOSPITAL ENCOUNTER (OUTPATIENT)
Dept: RADIOLOGY | Facility: MEDICAL CENTER | Age: 39
End: 2024-05-17
Attending: STUDENT IN AN ORGANIZED HEALTH CARE EDUCATION/TRAINING PROGRAM
Payer: COMMERCIAL

## 2024-05-17 DIAGNOSIS — M54.59 MECHANICAL LOW BACK PAIN: ICD-10-CM

## 2024-06-20 ENCOUNTER — OFFICE VISIT (OUTPATIENT)
Dept: URGENT CARE | Facility: CLINIC | Age: 39
End: 2024-06-20
Payer: COMMERCIAL

## 2024-06-20 VITALS
DIASTOLIC BLOOD PRESSURE: 82 MMHG | HEIGHT: 65 IN | BODY MASS INDEX: 31.49 KG/M2 | TEMPERATURE: 98.1 F | WEIGHT: 189 LBS | SYSTOLIC BLOOD PRESSURE: 122 MMHG | HEART RATE: 87 BPM | OXYGEN SATURATION: 100 % | RESPIRATION RATE: 16 BRPM

## 2024-06-20 DIAGNOSIS — B96.89 ACUTE BACTERIAL SINUSITIS: ICD-10-CM

## 2024-06-20 DIAGNOSIS — J01.90 ACUTE BACTERIAL SINUSITIS: ICD-10-CM

## 2024-06-20 PROCEDURE — 99213 OFFICE O/P EST LOW 20 MIN: CPT | Performed by: NURSE PRACTITIONER

## 2024-06-20 PROCEDURE — 3079F DIAST BP 80-89 MM HG: CPT | Performed by: NURSE PRACTITIONER

## 2024-06-20 PROCEDURE — 3074F SYST BP LT 130 MM HG: CPT | Performed by: NURSE PRACTITIONER

## 2024-06-20 RX ORDER — ACETAMINOPHEN AND CODEINE PHOSPHATE 120; 12 MG/5ML; MG/5ML
SOLUTION ORAL
COMMUNITY
Start: 2024-02-28

## 2024-06-20 RX ORDER — AMOXICILLIN AND CLAVULANATE POTASSIUM 875; 125 MG/1; MG/1
1 TABLET, FILM COATED ORAL 2 TIMES DAILY
Qty: 14 TABLET | Refills: 0 | Status: SHIPPED | OUTPATIENT
Start: 2024-06-20 | End: 2024-06-27

## 2024-06-20 RX ORDER — ASPIRIN 81 MG/1
TABLET, CHEWABLE ORAL
COMMUNITY

## 2024-06-20 ASSESSMENT — ENCOUNTER SYMPTOMS: SINUS PAIN: 1

## 2024-06-20 ASSESSMENT — FIBROSIS 4 INDEX: FIB4 SCORE: 0.81

## 2024-06-20 NOTE — PROGRESS NOTES
Subjective     Yun Napier is a 39 y.o. female who presents with Sinus Problem (X1 week, facial, pressure, headache, and neck pain )            Sinus Problem  This is a new problem. Episode onset: pt reports new onset of cold symptoms that started a week ago. +sinus pressure and pain below her eyes. feels to be getting worse in the last few days. no fevers. feels similar to previous sinus infections. Associated symptoms include congestion. Past treatments include oral decongestants. The treatment provided no relief.       Review of Systems   HENT:  Positive for congestion and sinus pain.    All other systems reviewed and are negative.         Past Medical History:   Diagnosis Date    Migraine       Past Surgical History:   Procedure Laterality Date    DILATION AND EVACUATION N/A 10/2/2022    Procedure: DILATION AND EVACUATION, UTERUS;  Surgeon: Tamiko Metcalf M.D.;  Location: SURGERY [facility];  Service: Obstetrics      Social History     Socioeconomic History    Marital status:      Spouse name: Not on file    Number of children: Not on file    Years of education: Not on file    Highest education level: Not on file   Occupational History    Not on file   Tobacco Use    Smoking status: Never    Smokeless tobacco: Never   Vaping Use    Vaping status: Never Used   Substance and Sexual Activity    Alcohol use: Yes     Comment: occ    Drug use: No    Sexual activity: Yes     Partners: Male     Birth control/protection: Pill   Other Topics Concern    Not on file   Social History Narrative    Not on file     Social Determinants of Health     Financial Resource Strain: Not on file   Food Insecurity: Not on file   Transportation Needs: Not on file   Physical Activity: Not on file   Stress: Not on file   Social Connections: Not on file   Intimate Partner Violence: Not on file   Housing Stability: Not on file         Objective     /82   Pulse 87   Temp 36.7 °C (98.1 °F) (Temporal)    "Resp 16   Ht 1.651 m (5' 5\")   Wt 85.7 kg (189 lb)   LMP  (LMP Unknown)   SpO2 100%   Breastfeeding Yes   BMI 31.45 kg/m²      Physical Exam  Vitals and nursing note reviewed.   Constitutional:       Appearance: Normal appearance. She is normal weight.   HENT:      Head: Normocephalic and atraumatic.      Right Ear: Tympanic membrane and external ear normal.      Left Ear: Tympanic membrane and external ear normal.      Nose: Congestion present.      Right Sinus: Maxillary sinus tenderness present.      Left Sinus: Maxillary sinus tenderness present.      Mouth/Throat:      Mouth: Mucous membranes are moist.      Pharynx: Oropharynx is clear.   Eyes:      Extraocular Movements: Extraocular movements intact.      Pupils: Pupils are equal, round, and reactive to light.   Cardiovascular:      Rate and Rhythm: Normal rate and regular rhythm.   Pulmonary:      Effort: Pulmonary effort is normal.   Musculoskeletal:         General: Normal range of motion.      Cervical back: Normal range of motion.   Skin:     General: Skin is warm and dry.      Capillary Refill: Capillary refill takes less than 2 seconds.   Neurological:      General: No focal deficit present.      Mental Status: She is alert and oriented to person, place, and time. Mental status is at baseline.   Psychiatric:         Mood and Affect: Mood normal.         Speech: Speech normal.         Thought Content: Thought content normal.         Judgment: Judgment normal.                             Assessment & Plan        1. Acute bacterial sinusitis  - amoxicillin-clavulanate (AUGMENTIN) 875-125 MG Tab; Take 1 Tablet by mouth 2 times a day for 7 days.  Dispense: 14 Tablet; Refill: 0       Take full course of abx as directed  Continue decongestant when needed   Increase water intake  She is currently breastfeeding, not exclusively but discussed RID risk and any side effects she might notice with baby  Tylenol and ibuprofen as needed for pain  Supportive " care, differential diagnoses, and indications for immediate follow-up discussed with patient.    Pathogenesis of diagnosis discussed including typical length and natural progression.    Instructed to return to UC or nearest emergency department if symptoms fail to improve, for any change in condition, further concerns, or new concerning symptoms.  Patient states understanding of the plan of care and discharge instructions.

## 2025-02-07 ENCOUNTER — APPOINTMENT (OUTPATIENT)
Dept: URGENT CARE | Facility: CLINIC | Age: 40
End: 2025-02-07
Payer: COMMERCIAL

## (undated) DEVICE — SET EXTENSION WITH 2 PORTS (48EA/CA) ***PART #2C8610 IS A SUBSTITUTE*****

## (undated) DEVICE — GOWN WARMING STANDARD FLEX - (30/CA)

## (undated) DEVICE — LACTATED RINGERS INJ 1000 ML - (14EA/CA 60CA/PF)

## (undated) DEVICE — TOWEL STOP TIMEOUT SAFETY FLAG (40EA/CA)

## (undated) DEVICE — TUBING CLEARLINK DUO-VENT - C-FLO (48EA/CA)

## (undated) DEVICE — SET LEADWIRE 5 LEAD BEDSIDE DISPOSABLE ECG (1SET OF 5/EA)

## (undated) DEVICE — PAD SANITARY 11IN MAXI IND WRAPPED  (12EA/PK 24PK/CA)

## (undated) DEVICE — CANISTER SUCTION 3000ML MECHANICAL FILTER AUTO SHUTOFF MEDI-VAC NONSTERILE LF DISP  (40EA/CA)

## (undated) DEVICE — TRAY SRGPRP PVP IOD WT PRP - (20/CA)

## (undated) DEVICE — SUCTION INSTRUMENT YANKAUER BULBOUS TIP W/O VENT (50EA/CA)

## (undated) DEVICE — TUBING D & E COLLECTION SET (50EA/PK)

## (undated) DEVICE — Device

## (undated) DEVICE — SENSOR OXIMETER ADULT SPO2 RD SET (20EA/BX)

## (undated) DEVICE — GLOVE BIOGEL SZ 6.5 SURGICAL PF LTX (50PR/BX 4BX/CA)

## (undated) DEVICE — SLEEVE VASO CALF MED - (10PR/CA)

## (undated) DEVICE — SODIUM CHL IRRIGATION 0.9% 1000ML (12EA/CA)

## (undated) DEVICE — KIT D & C COLLECTION (10EA/PK)